# Patient Record
Sex: MALE | Race: WHITE | Employment: OTHER | URBAN - METROPOLITAN AREA
[De-identification: names, ages, dates, MRNs, and addresses within clinical notes are randomized per-mention and may not be internally consistent; named-entity substitution may affect disease eponyms.]

---

## 2017-05-13 ENCOUNTER — HOSPITAL ENCOUNTER (OUTPATIENT)
Dept: OTHER | Age: 67
Discharge: HOME | End: 2017-05-13
Attending: UROLOGY | Admitting: UROLOGY

## 2017-05-13 LAB — PROSTATE SPECIFIC ANTIGEN: 4.87 NG/ML (ref 0–4)

## 2017-08-05 ENCOUNTER — HOSPITAL ENCOUNTER (OUTPATIENT)
Dept: OTHER | Age: 67
Discharge: HOME | End: 2017-08-05
Attending: INTERNAL MEDICINE | Admitting: INTERNAL MEDICINE

## 2017-08-05 LAB
ALBUMIN SERPL-MCNC: 4.3 G/DL (ref 3.4–5)
ALP BLD-CCNC: 54 U/L (ref 40–129)
ALT SERPL-CCNC: 24 U/L (ref 10–40)
ANION GAP SERPL CALCULATED.3IONS-SCNC: 14 MMOL/L (ref 3–16)
AST SERPL-CCNC: 24 U/L (ref 15–37)
BASOPHILS ABSOLUTE: 0.1 K/UL (ref 0–0.2)
BASOPHILS RELATIVE PERCENT: 0.9 %
BILIRUB SERPL-MCNC: 0.6 MG/DL (ref 0–1)
BILIRUBIN DIRECT: <0.2 MG/DL (ref 0–0.3)
BILIRUBIN, INDIRECT: NORMAL MG/DL (ref 0–1)
BUN BLDV-MCNC: 16 MG/DL (ref 7–20)
CALCIUM SERPL-MCNC: 9.5 MG/DL (ref 8.3–10.6)
CHLORIDE BLD-SCNC: 97 MMOL/L (ref 99–110)
CHOLESTEROL, TOTAL: 134 MG/DL (ref 0–199)
CO2: 29 MMOL/L (ref 21–32)
CREAT SERPL-MCNC: 0.8 MG/DL (ref 0.8–1.3)
EOSINOPHILS ABSOLUTE: 0.4 K/UL (ref 0–0.6)
EOSINOPHILS RELATIVE PERCENT: 4.8 %
GFR AFRICAN AMERICAN: >60
GFR NON-AFRICAN AMERICAN: >60
GLUCOSE BLD-MCNC: 135 MG/DL (ref 70–99)
HCT VFR BLD CALC: 47.1 % (ref 40.5–52.5)
HDLC SERPL-MCNC: 34 MG/DL (ref 40–60)
HEMOGLOBIN: 15.8 G/DL (ref 13.5–17.5)
LDL CHOLESTEROL CALCULATED: 60 MG/DL
LYMPHOCYTES ABSOLUTE: 2.3 K/UL (ref 1–5.1)
LYMPHOCYTES RELATIVE PERCENT: 27.8 %
MCH RBC QN AUTO: 28.9 PG (ref 26–34)
MCHC RBC AUTO-ENTMCNC: 33.7 G/DL (ref 31–36)
MCV RBC AUTO: 85.9 FL (ref 80–100)
MONOCYTES ABSOLUTE: 0.5 K/UL (ref 0–1.3)
MONOCYTES RELATIVE PERCENT: 5.8 %
NEUTROPHILS ABSOLUTE: 5 K/UL (ref 1.7–7.7)
NEUTROPHILS RELATIVE PERCENT: 60.7 %
PDW BLD-RTO: 14.7 % (ref 12.4–15.4)
PLATELET # BLD: 276 K/UL (ref 135–450)
PMV BLD AUTO: 8.3 FL (ref 5–10.5)
POTASSIUM SERPL-SCNC: 4.3 MMOL/L (ref 3.5–5.1)
RBC # BLD: 5.48 M/UL (ref 4.2–5.9)
SODIUM BLD-SCNC: 140 MMOL/L (ref 136–145)
TOTAL PROTEIN: 7.1 G/DL (ref 6.4–8.2)
TRIGL SERPL-MCNC: 198 MG/DL (ref 0–150)
VLDLC SERPL CALC-MCNC: 40 MG/DL
WBC # BLD: 8.2 K/UL (ref 4–11)

## 2017-08-06 LAB
ESTIMATED AVERAGE GLUCOSE: 171.4 MG/DL
HBA1C MFR BLD: 7.6 %

## 2018-08-21 ENCOUNTER — HOSPITAL ENCOUNTER (OUTPATIENT)
Dept: ULTRASOUND IMAGING | Age: 68
Discharge: HOME | End: 2018-08-21
Attending: INTERNAL MEDICINE | Admitting: INTERNAL MEDICINE

## 2018-08-21 DIAGNOSIS — Z13.6 ENCOUNTER FOR SCREENING FOR CARDIOVASCULAR DISORDERS: ICD-10-CM

## 2018-08-21 DIAGNOSIS — Z13.6 SCREENING FOR AAA (AORTIC ABDOMINAL ANEURYSM): ICD-10-CM

## 2021-01-11 RX ORDER — PIOGLITAZONEHYDROCHLORIDE 45 MG/1
45 TABLET ORAL DAILY
COMMUNITY

## 2021-01-11 RX ORDER — AMLODIPINE BESYLATE AND BENAZEPRIL HYDROCHLORIDE 5; 10 MG/1; MG/1
1 CAPSULE ORAL DAILY
COMMUNITY

## 2021-01-11 RX ORDER — PAROXETINE HYDROCHLORIDE 20 MG/1
20 TABLET, FILM COATED ORAL NIGHTLY
COMMUNITY

## 2021-01-11 RX ORDER — HYDROCHLOROTHIAZIDE 12.5 MG/1
12.5 CAPSULE, GELATIN COATED ORAL DAILY
COMMUNITY

## 2021-01-11 RX ORDER — GLIMEPIRIDE 2 MG/1
2 TABLET ORAL 2 TIMES DAILY
COMMUNITY
End: 2022-11-02

## 2021-01-11 SDOH — HEALTH STABILITY: MENTAL HEALTH: HOW OFTEN DO YOU HAVE A DRINK CONTAINING ALCOHOL?: NEVER

## 2021-01-22 ENCOUNTER — OFFICE VISIT (OUTPATIENT)
Dept: PRIMARY CARE CLINIC | Age: 71
End: 2021-01-22
Payer: MEDICARE

## 2021-01-22 ENCOUNTER — HOSPITAL ENCOUNTER (OUTPATIENT)
Dept: GENERAL RADIOLOGY | Age: 71
Discharge: HOME | End: 2021-01-22
Payer: MEDICARE

## 2021-01-22 ENCOUNTER — HOSPITAL ENCOUNTER (OUTPATIENT)
Age: 71
Discharge: HOME | End: 2021-01-22
Payer: MEDICARE

## 2021-01-22 DIAGNOSIS — Z20.828 EXPOSURE TO SARS-ASSOCIATED CORONAVIRUS: Primary | ICD-10-CM

## 2021-01-22 DIAGNOSIS — Z01.818 PRE-OP TESTING: ICD-10-CM

## 2021-01-22 LAB
A/G RATIO: 1.6 (ref 1.1–2.2)
ABO/RH: NORMAL
ALBUMIN SERPL-MCNC: 4.5 G/DL (ref 3.4–5)
ALP BLD-CCNC: 55 U/L (ref 40–129)
ALT SERPL-CCNC: 15 U/L (ref 10–40)
ANION GAP SERPL CALCULATED.3IONS-SCNC: 12 MMOL/L (ref 3–16)
ANTIBODY SCREEN: NORMAL
APTT: 32 SEC (ref 24.2–36.2)
AST SERPL-CCNC: 19 U/L (ref 15–37)
BASOPHILS ABSOLUTE: 0 K/UL (ref 0–0.2)
BASOPHILS RELATIVE PERCENT: 0.3 %
BILIRUB SERPL-MCNC: 1 MG/DL (ref 0–1)
BUN BLDV-MCNC: 21 MG/DL (ref 7–20)
CALCIUM SERPL-MCNC: 10.1 MG/DL (ref 8.3–10.6)
CHLORIDE BLD-SCNC: 98 MMOL/L (ref 99–110)
CO2: 29 MMOL/L (ref 21–32)
CREAT SERPL-MCNC: 0.9 MG/DL (ref 0.8–1.3)
EKG ATRIAL RATE: 100 BPM
EKG DIAGNOSIS: NORMAL
EKG P AXIS: 53 DEGREES
EKG P-R INTERVAL: 186 MS
EKG Q-T INTERVAL: 396 MS
EKG QRS DURATION: 146 MS
EKG QTC CALCULATION (BAZETT): 510 MS
EKG R AXIS: 13 DEGREES
EKG T AXIS: 38 DEGREES
EKG VENTRICULAR RATE: 100 BPM
EOSINOPHILS ABSOLUTE: 0.3 K/UL (ref 0–0.6)
EOSINOPHILS RELATIVE PERCENT: 3 %
GFR AFRICAN AMERICAN: >60
GFR NON-AFRICAN AMERICAN: >60
GLOBULIN: 2.9 G/DL
GLUCOSE BLD-MCNC: 177 MG/DL (ref 70–99)
HCT VFR BLD CALC: 48 % (ref 40.5–52.5)
HEMOGLOBIN: 15.9 G/DL (ref 13.5–17.5)
INR BLD: 1.09 (ref 0.86–1.14)
LYMPHOCYTES ABSOLUTE: 1.8 K/UL (ref 1–5.1)
LYMPHOCYTES RELATIVE PERCENT: 19.2 %
MCH RBC QN AUTO: 29.2 PG (ref 26–34)
MCHC RBC AUTO-ENTMCNC: 33.2 G/DL (ref 31–36)
MCV RBC AUTO: 88 FL (ref 80–100)
MONOCYTES ABSOLUTE: 0.7 K/UL (ref 0–1.3)
MONOCYTES RELATIVE PERCENT: 8 %
NEUTROPHILS ABSOLUTE: 6.4 K/UL (ref 1.7–7.7)
NEUTROPHILS RELATIVE PERCENT: 69.5 %
PDW BLD-RTO: 14.9 % (ref 12.4–15.4)
PLATELET # BLD: 355 K/UL (ref 135–450)
PMV BLD AUTO: 8.6 FL (ref 5–10.5)
POTASSIUM SERPL-SCNC: 4.8 MMOL/L (ref 3.5–5.1)
PROTHROMBIN TIME: 12.7 SEC (ref 10–13.2)
RBC # BLD: 5.45 M/UL (ref 4.2–5.9)
SARS-COV-2: NOT DETECTED
SODIUM BLD-SCNC: 139 MMOL/L (ref 136–145)
TOTAL PROTEIN: 7.4 G/DL (ref 6.4–8.2)
WBC # BLD: 9.2 K/UL (ref 4–11)

## 2021-01-22 PROCEDURE — 85610 PROTHROMBIN TIME: CPT

## 2021-01-22 PROCEDURE — 86900 BLOOD TYPING SEROLOGIC ABO: CPT

## 2021-01-22 PROCEDURE — 93010 ELECTROCARDIOGRAM REPORT: CPT | Performed by: INTERNAL MEDICINE

## 2021-01-22 PROCEDURE — 71046 X-RAY EXAM CHEST 2 VIEWS: CPT

## 2021-01-22 PROCEDURE — 99211 OFF/OP EST MAY X REQ PHY/QHP: CPT | Performed by: NURSE PRACTITIONER

## 2021-01-22 PROCEDURE — 86901 BLOOD TYPING SEROLOGIC RH(D): CPT

## 2021-01-22 PROCEDURE — 36415 COLL VENOUS BLD VENIPUNCTURE: CPT

## 2021-01-22 PROCEDURE — 86850 RBC ANTIBODY SCREEN: CPT

## 2021-01-22 PROCEDURE — 93005 ELECTROCARDIOGRAM TRACING: CPT | Performed by: UROLOGY

## 2021-01-22 PROCEDURE — 85730 THROMBOPLASTIN TIME PARTIAL: CPT

## 2021-01-22 PROCEDURE — 85025 COMPLETE CBC W/AUTO DIFF WBC: CPT

## 2021-01-22 PROCEDURE — 80053 COMPREHEN METABOLIC PANEL: CPT

## 2021-01-22 NOTE — PROGRESS NOTES
Royal Noriega received a viral test for COVID-19. They were educated on isolation and quarantine as appropriate. For any symptoms, they were directed to seek care from their PCP, given contact information to establish with a doctor, directed to an urgent care or the emergency room.

## 2021-01-22 NOTE — PATIENT INSTRUCTIONS
You have received a viral test for COVID-19. Below is education on quarantine per the CDC guidelines. For any symptoms, seek care from your PCP, call [phone] to establish care with a doctor, or go directly to an urgent care or the emergency room. Test results will take 2-7 days and will be sent to you in your [employer] account. If you test positive, you will be contacted via phone. If you test negative, the ONLY communication will be through [address] GO TO [employer] AND SIGN UP FOR [employer]  (LOWER LEFT OF THE HOME PAGE)  No test is 100%. If you have symptoms, you should follow the guidance of quarantine as previously stated. You can still be contagious if you have symptoms. Your [facility] will reach out to you if you have a positive result. They will provide you with a return to work date and note. If you were tested for a pre-op, then you should remain in quarantine until your procedure. How do I know if I need to be in quarantine? If you live in a community where COVID-19 is or might be spreading (currently, that is virtually everywhere in the [place])  Be alert for symptoms. Watch for fever, cough, shortness of breath, or other symptoms of COVID-19.  ? Take your temperature if symptoms develop. ? Practice social distancing. Maintain 6 feet of distance from others and stay out of crowded places. ? Follow CDC guidance if symptoms develop. If you feel healthy but:  ? Recently had close contact with a person with COVID-19 you need to Quarantine:  ? Stay home until 14 days after your last exposure. ? Check your temperature twice a day and watch for symptoms of COVID-19.  ? If possible, stay away from people who are at higher-risk for getting very sick from COVID-19. Stay Home and Monitor Your Health if you:  ? Have been diagnosed with COVID-19, or  ? Are waiting for test results, or  ?  Have cough, fever, or shortness of breath, or symptoms of COVID-19 When You Can be Around Others After You Had or Likely Had COVID-19     If you have or think you might have COVID-19, it is important to stay home and away from other people. Staying away from others helps stop the spread of COVID-19. If you have an emergency warning sign (including trouble breathing), get emergency medical care immediately. When you can be around others (end home isolation) depends on different factors for different situations. Find CDC's recommendations for your situation below. I think or know I had COVID-19, and I had symptoms  You can be with others after  ? 3 days with no fever and  ? Respiratory symptoms have improved (e.g. cough, shortness of breath) and  ? 10 days since symptoms first appeared  Depending on your healthcare provider's advice and availability of testing, you might get tested to see if you still have COVID-19. If you will be tested, you can be around others when you have no fever, respiratory symptoms have improved, and you receive two negative test results in a row, at least 24 hours apart. I tested positive for COVID-19 but had no symptoms  If you continue to have no symptoms, you can be with others after:  ? 10 days have passed since test or 14 days since your exposure test   Depending on your healthcare provider's advice and availability of testing, you might get tested to see if you still have COVID-19. If you will be tested, you can be around others after you receive two negative test results in a row, at least 24 hours apart. If you develop symptoms after testing positive, follow the guidance above for I think or know I had COVID, and I had symptoms.   For Anyone Who Has Been Around a Person with COVID-19  It is important to remember that anyone who has close contact with someone with COVID-19 should stay home for 14 days after exposure based on the time it takes to develop illness. Testing is not necessary.     www.cdc.gov/coronavirus/2019-ncov/index.html

## 2021-01-28 ENCOUNTER — ANESTHESIA EVENT (OUTPATIENT)
Dept: OPERATING ROOM | Age: 71
End: 2021-01-28
Payer: MEDICARE

## 2021-01-28 ENCOUNTER — ANESTHESIA (OUTPATIENT)
Dept: OPERATING ROOM | Age: 71
End: 2021-01-28
Payer: MEDICARE

## 2021-01-28 ENCOUNTER — HOSPITAL ENCOUNTER (OUTPATIENT)
Age: 71
Setting detail: OUTPATIENT SURGERY
Discharge: HOME | End: 2021-01-28
Attending: UROLOGY | Admitting: UROLOGY
Payer: MEDICARE

## 2021-01-28 VITALS
OXYGEN SATURATION: 100 % | SYSTOLIC BLOOD PRESSURE: 101 MMHG | TEMPERATURE: 97.9 F | RESPIRATION RATE: 14 BRPM | DIASTOLIC BLOOD PRESSURE: 60 MMHG

## 2021-01-28 VITALS
OXYGEN SATURATION: 97 % | HEIGHT: 70 IN | SYSTOLIC BLOOD PRESSURE: 146 MMHG | DIASTOLIC BLOOD PRESSURE: 63 MMHG | HEART RATE: 88 BPM | RESPIRATION RATE: 15 BRPM | TEMPERATURE: 97.6 F | WEIGHT: 195.44 LBS | BODY MASS INDEX: 27.98 KG/M2

## 2021-01-28 DIAGNOSIS — Z01.818 PRE-OP TESTING: Primary | ICD-10-CM

## 2021-01-28 DIAGNOSIS — C61 PROSTATE CANCER (HCC): ICD-10-CM

## 2021-01-28 LAB
ABO/RH: NORMAL
ANTIBODY SCREEN: NORMAL
GLUCOSE BLD-MCNC: 166 MG/DL (ref 70–99)
GLUCOSE BLD-MCNC: 197 MG/DL (ref 70–99)
PERFORMED ON: ABNORMAL
PERFORMED ON: ABNORMAL

## 2021-01-28 PROCEDURE — 2500000003 HC RX 250 WO HCPCS: Performed by: NURSE ANESTHETIST, CERTIFIED REGISTERED

## 2021-01-28 PROCEDURE — 2580000003 HC RX 258: Performed by: ANESTHESIOLOGY

## 2021-01-28 PROCEDURE — 3700000001 HC ADD 15 MINUTES (ANESTHESIA): Performed by: UROLOGY

## 2021-01-28 PROCEDURE — 86850 RBC ANTIBODY SCREEN: CPT

## 2021-01-28 PROCEDURE — 2580000003 HC RX 258: Performed by: UROLOGY

## 2021-01-28 PROCEDURE — 88309 TISSUE EXAM BY PATHOLOGIST: CPT

## 2021-01-28 PROCEDURE — 2500000003 HC RX 250 WO HCPCS: Performed by: UROLOGY

## 2021-01-28 PROCEDURE — 36415 COLL VENOUS BLD VENIPUNCTURE: CPT

## 2021-01-28 PROCEDURE — 2580000003 HC RX 258: Performed by: NURSE ANESTHETIST, CERTIFIED REGISTERED

## 2021-01-28 PROCEDURE — 6360000002 HC RX W HCPCS: Performed by: ANESTHESIOLOGY

## 2021-01-28 PROCEDURE — 7100000010 HC PHASE II RECOVERY - FIRST 15 MIN: Performed by: UROLOGY

## 2021-01-28 PROCEDURE — 7100000000 HC PACU RECOVERY - FIRST 15 MIN: Performed by: UROLOGY

## 2021-01-28 PROCEDURE — 86900 BLOOD TYPING SEROLOGIC ABO: CPT

## 2021-01-28 PROCEDURE — 3600000019 HC SURGERY ROBOT ADDTL 15MIN: Performed by: UROLOGY

## 2021-01-28 PROCEDURE — 3600000009 HC SURGERY ROBOT BASE: Performed by: UROLOGY

## 2021-01-28 PROCEDURE — S2900 ROBOTIC SURGICAL SYSTEM: HCPCS | Performed by: UROLOGY

## 2021-01-28 PROCEDURE — 3700000000 HC ANESTHESIA ATTENDED CARE: Performed by: UROLOGY

## 2021-01-28 PROCEDURE — 2720000010 HC SURG SUPPLY STERILE: Performed by: UROLOGY

## 2021-01-28 PROCEDURE — 7100000001 HC PACU RECOVERY - ADDTL 15 MIN: Performed by: UROLOGY

## 2021-01-28 PROCEDURE — 88305 TISSUE EXAM BY PATHOLOGIST: CPT

## 2021-01-28 PROCEDURE — 6370000000 HC RX 637 (ALT 250 FOR IP): Performed by: ANESTHESIOLOGY

## 2021-01-28 PROCEDURE — C9290 INJ, BUPIVACAINE LIPOSOME: HCPCS | Performed by: UROLOGY

## 2021-01-28 PROCEDURE — 2709999900 HC NON-CHARGEABLE SUPPLY: Performed by: UROLOGY

## 2021-01-28 PROCEDURE — 86901 BLOOD TYPING SEROLOGIC RH(D): CPT

## 2021-01-28 PROCEDURE — 6360000002 HC RX W HCPCS: Performed by: UROLOGY

## 2021-01-28 PROCEDURE — 6360000002 HC RX W HCPCS: Performed by: NURSE ANESTHETIST, CERTIFIED REGISTERED

## 2021-01-28 PROCEDURE — 7100000011 HC PHASE II RECOVERY - ADDTL 15 MIN: Performed by: UROLOGY

## 2021-01-28 PROCEDURE — 6360000002 HC RX W HCPCS

## 2021-01-28 PROCEDURE — 6370000000 HC RX 637 (ALT 250 FOR IP): Performed by: UROLOGY

## 2021-01-28 RX ORDER — DEXAMETHASONE SODIUM PHOSPHATE 4 MG/ML
INJECTION, SOLUTION INTRA-ARTICULAR; INTRALESIONAL; INTRAMUSCULAR; INTRAVENOUS; SOFT TISSUE PRN
Status: DISCONTINUED | OUTPATIENT
Start: 2021-01-28 | End: 2021-01-28 | Stop reason: SDUPTHER

## 2021-01-28 RX ORDER — ATROPA BELLADONNA AND OPIUM 16.2; 6 MG/1; MG/1
SUPPOSITORY RECTAL
Status: COMPLETED | OUTPATIENT
Start: 2021-01-28 | End: 2021-01-28

## 2021-01-28 RX ORDER — ONDANSETRON 4 MG/1
4 TABLET, FILM COATED ORAL 3 TIMES DAILY PRN
Qty: 10 TABLET | Refills: 0 | Status: SHIPPED | OUTPATIENT
Start: 2021-01-28 | End: 2022-11-02

## 2021-01-28 RX ORDER — NALOXONE HYDROCHLORIDE 1 MG/ML
0.04 INJECTION INTRAMUSCULAR; INTRAVENOUS; SUBCUTANEOUS ONCE
Status: COMPLETED | OUTPATIENT
Start: 2021-01-28 | End: 2021-01-28

## 2021-01-28 RX ORDER — LIDOCAINE HYDROCHLORIDE 10 MG/ML
1 INJECTION, SOLUTION EPIDURAL; INFILTRATION; INTRACAUDAL; PERINEURAL
Status: DISCONTINUED | OUTPATIENT
Start: 2021-01-28 | End: 2021-01-28 | Stop reason: HOSPADM

## 2021-01-28 RX ORDER — ONDANSETRON 2 MG/ML
INJECTION INTRAMUSCULAR; INTRAVENOUS PRN
Status: DISCONTINUED | OUTPATIENT
Start: 2021-01-28 | End: 2021-01-28 | Stop reason: SDUPTHER

## 2021-01-28 RX ORDER — KETAMINE HCL IN NACL, ISO-OSM 100MG/10ML
SYRINGE (ML) INJECTION PRN
Status: DISCONTINUED | OUTPATIENT
Start: 2021-01-28 | End: 2021-01-28 | Stop reason: SDUPTHER

## 2021-01-28 RX ORDER — MAGNESIUM SULFATE HEPTAHYDRATE 500 MG/ML
INJECTION, SOLUTION INTRAMUSCULAR; INTRAVENOUS PRN
Status: DISCONTINUED | OUTPATIENT
Start: 2021-01-28 | End: 2021-01-28 | Stop reason: SDUPTHER

## 2021-01-28 RX ORDER — APREPITANT 40 MG/1
40 CAPSULE ORAL ONCE
Status: COMPLETED | OUTPATIENT
Start: 2021-01-28 | End: 2021-01-28

## 2021-01-28 RX ORDER — LIDOCAINE HYDROCHLORIDE 20 MG/ML
INJECTION, SOLUTION EPIDURAL; INFILTRATION; INTRACAUDAL; PERINEURAL PRN
Status: DISCONTINUED | OUTPATIENT
Start: 2021-01-28 | End: 2021-01-28 | Stop reason: SDUPTHER

## 2021-01-28 RX ORDER — HYDROMORPHONE HCL 110MG/55ML
0.25 PATIENT CONTROLLED ANALGESIA SYRINGE INTRAVENOUS EVERY 5 MIN PRN
Status: DISCONTINUED | OUTPATIENT
Start: 2021-01-28 | End: 2021-01-28 | Stop reason: HOSPADM

## 2021-01-28 RX ORDER — MAGNESIUM HYDROXIDE 1200 MG/15ML
LIQUID ORAL
Status: COMPLETED | OUTPATIENT
Start: 2021-01-28 | End: 2021-01-28

## 2021-01-28 RX ORDER — SODIUM CHLORIDE 9 MG/ML
INJECTION, SOLUTION INTRAVENOUS CONTINUOUS PRN
Status: DISCONTINUED | OUTPATIENT
Start: 2021-01-28 | End: 2021-01-28 | Stop reason: SDUPTHER

## 2021-01-28 RX ORDER — LIDOCAINE HYDROCHLORIDE 10 MG/ML
0.5 INJECTION, SOLUTION EPIDURAL; INFILTRATION; INTRACAUDAL; PERINEURAL ONCE
Status: DISCONTINUED | OUTPATIENT
Start: 2021-01-28 | End: 2021-01-28 | Stop reason: HOSPADM

## 2021-01-28 RX ORDER — SODIUM CHLORIDE 9 MG/ML
INJECTION, SOLUTION INTRAVENOUS CONTINUOUS
Status: DISCONTINUED | OUTPATIENT
Start: 2021-01-28 | End: 2021-01-28 | Stop reason: HOSPADM

## 2021-01-28 RX ORDER — VECURONIUM BROMIDE 1 MG/ML
INJECTION, POWDER, LYOPHILIZED, FOR SOLUTION INTRAVENOUS PRN
Status: DISCONTINUED | OUTPATIENT
Start: 2021-01-28 | End: 2021-01-28 | Stop reason: SDUPTHER

## 2021-01-28 RX ORDER — ACETAMINOPHEN 325 MG/1
650 TABLET ORAL ONCE
Status: COMPLETED | OUTPATIENT
Start: 2021-01-28 | End: 2021-01-28

## 2021-01-28 RX ORDER — PROPOFOL 10 MG/ML
INJECTION, EMULSION INTRAVENOUS PRN
Status: DISCONTINUED | OUTPATIENT
Start: 2021-01-28 | End: 2021-01-28 | Stop reason: SDUPTHER

## 2021-01-28 RX ORDER — APREPITANT 40 MG/1
CAPSULE ORAL
Status: COMPLETED
Start: 2021-01-28 | End: 2021-01-28

## 2021-01-28 RX ORDER — BUPIVACAINE HYDROCHLORIDE 5 MG/ML
INJECTION, SOLUTION EPIDURAL; INTRACAUDAL
Status: COMPLETED | OUTPATIENT
Start: 2021-01-28 | End: 2021-01-28

## 2021-01-28 RX ORDER — HYDROMORPHONE HCL 110MG/55ML
PATIENT CONTROLLED ANALGESIA SYRINGE INTRAVENOUS PRN
Status: DISCONTINUED | OUTPATIENT
Start: 2021-01-28 | End: 2021-01-28 | Stop reason: SDUPTHER

## 2021-01-28 RX ORDER — HYDROMORPHONE HCL 110MG/55ML
0.5 PATIENT CONTROLLED ANALGESIA SYRINGE INTRAVENOUS EVERY 5 MIN PRN
Status: DISCONTINUED | OUTPATIENT
Start: 2021-01-28 | End: 2021-01-28 | Stop reason: HOSPADM

## 2021-01-28 RX ORDER — FENTANYL CITRATE 50 UG/ML
INJECTION, SOLUTION INTRAMUSCULAR; INTRAVENOUS PRN
Status: DISCONTINUED | OUTPATIENT
Start: 2021-01-28 | End: 2021-01-28 | Stop reason: SDUPTHER

## 2021-01-28 RX ORDER — HYDROCODONE BITARTRATE AND ACETAMINOPHEN 5; 325 MG/1; MG/1
1 TABLET ORAL
Status: DISCONTINUED | OUTPATIENT
Start: 2021-01-28 | End: 2021-01-28 | Stop reason: HOSPADM

## 2021-01-28 RX ORDER — SUCCINYLCHOLINE/SOD CL,ISO/PF 200MG/10ML
SYRINGE (ML) INTRAVENOUS PRN
Status: DISCONTINUED | OUTPATIENT
Start: 2021-01-28 | End: 2021-01-28 | Stop reason: SDUPTHER

## 2021-01-28 RX ORDER — EPHEDRINE SULFATE/0.9% NACL/PF 50 MG/5 ML
SYRINGE (ML) INTRAVENOUS PRN
Status: DISCONTINUED | OUTPATIENT
Start: 2021-01-28 | End: 2021-01-28 | Stop reason: SDUPTHER

## 2021-01-28 RX ORDER — HYDROCODONE BITARTRATE AND ACETAMINOPHEN 5; 325 MG/1; MG/1
1 TABLET ORAL EVERY 6 HOURS PRN
Qty: 12 TABLET | Refills: 0 | Status: SHIPPED | OUTPATIENT
Start: 2021-01-28 | End: 2021-01-31

## 2021-01-28 RX ORDER — AMOXICILLIN 250 MG
1 CAPSULE ORAL 2 TIMES DAILY
Qty: 50 TABLET | Refills: 0 | Status: SHIPPED | OUTPATIENT
Start: 2021-01-28 | End: 2022-11-02

## 2021-01-28 RX ORDER — MAGNESIUM HYDROXIDE 1200 MG/15ML
LIQUID ORAL CONTINUOUS PRN
Status: COMPLETED | OUTPATIENT
Start: 2021-01-28 | End: 2021-01-28

## 2021-01-28 RX ORDER — LIDOCAINE HYDROCHLORIDE 20 MG/ML
JELLY TOPICAL
Status: COMPLETED | OUTPATIENT
Start: 2021-01-28 | End: 2021-01-28

## 2021-01-28 RX ORDER — ONDANSETRON 2 MG/ML
4 INJECTION INTRAMUSCULAR; INTRAVENOUS
Status: COMPLETED | OUTPATIENT
Start: 2021-01-28 | End: 2021-01-28

## 2021-01-28 RX ADMIN — Medication 100 MG: at 12:43

## 2021-01-28 RX ADMIN — VECURONIUM BROMIDE 2 MG: 1 INJECTION, POWDER, LYOPHILIZED, FOR SOLUTION INTRAVENOUS at 15:01

## 2021-01-28 RX ADMIN — HYDROMORPHONE HYDROCHLORIDE 0.2 MG: 2 INJECTION, SOLUTION INTRAMUSCULAR; INTRAVENOUS; SUBCUTANEOUS at 16:01

## 2021-01-28 RX ADMIN — Medication 20 MG: at 13:08

## 2021-01-28 RX ADMIN — MAGNESIUM SULFATE HEPTAHYDRATE 1 G: 500 INJECTION, SOLUTION INTRAMUSCULAR; INTRAVENOUS at 13:08

## 2021-01-28 RX ADMIN — HYDROMORPHONE HYDROCHLORIDE 0.4 MG: 2 INJECTION, SOLUTION INTRAMUSCULAR; INTRAVENOUS; SUBCUTANEOUS at 16:09

## 2021-01-28 RX ADMIN — NALOXONE HYDROCHLORIDE 0.04 MG: 1 INJECTION PARENTERAL at 17:03

## 2021-01-28 RX ADMIN — SODIUM CHLORIDE: 9 INJECTION, SOLUTION INTRAVENOUS at 12:38

## 2021-01-28 RX ADMIN — ONDANSETRON 4 MG: 2 INJECTION INTRAMUSCULAR; INTRAVENOUS at 15:42

## 2021-01-28 RX ADMIN — SODIUM CHLORIDE: 9 INJECTION, SOLUTION INTRAVENOUS at 11:46

## 2021-01-28 RX ADMIN — SODIUM CHLORIDE: 9 INJECTION, SOLUTION INTRAVENOUS at 11:53

## 2021-01-28 RX ADMIN — HYDROMORPHONE HYDROCHLORIDE 0.4 MG: 2 INJECTION, SOLUTION INTRAMUSCULAR; INTRAVENOUS; SUBCUTANEOUS at 14:29

## 2021-01-28 RX ADMIN — HYDROMORPHONE HYDROCHLORIDE 0.2 MG: 2 INJECTION, SOLUTION INTRAMUSCULAR; INTRAVENOUS; SUBCUTANEOUS at 16:05

## 2021-01-28 RX ADMIN — HYDROMORPHONE HYDROCHLORIDE 0.2 MG: 2 INJECTION, SOLUTION INTRAMUSCULAR; INTRAVENOUS; SUBCUTANEOUS at 16:17

## 2021-01-28 RX ADMIN — FENTANYL CITRATE 100 MCG: 50 INJECTION INTRAMUSCULAR; INTRAVENOUS at 12:43

## 2021-01-28 RX ADMIN — NALOXONE HYDROCHLORIDE 0.04 MG: 1 INJECTION PARENTERAL at 17:11

## 2021-01-28 RX ADMIN — HYDROMORPHONE HYDROCHLORIDE 0.2 MG: 2 INJECTION, SOLUTION INTRAMUSCULAR; INTRAVENOUS; SUBCUTANEOUS at 15:57

## 2021-01-28 RX ADMIN — CEFAZOLIN SODIUM 2000 MG: 10 INJECTION, POWDER, FOR SOLUTION INTRAVENOUS at 12:36

## 2021-01-28 RX ADMIN — PROPOFOL 200 MG: 10 INJECTION, EMULSION INTRAVENOUS at 12:43

## 2021-01-28 RX ADMIN — VECURONIUM BROMIDE 6 MG: 1 INJECTION, POWDER, LYOPHILIZED, FOR SOLUTION INTRAVENOUS at 12:50

## 2021-01-28 RX ADMIN — VECURONIUM BROMIDE 2 MG: 1 INJECTION, POWDER, LYOPHILIZED, FOR SOLUTION INTRAVENOUS at 14:27

## 2021-01-28 RX ADMIN — ONDANSETRON 4 MG: 2 INJECTION INTRAMUSCULAR; INTRAVENOUS at 18:22

## 2021-01-28 RX ADMIN — Medication 10 MG: at 13:08

## 2021-01-28 RX ADMIN — LIDOCAINE HYDROCHLORIDE 100 MG: 20 INJECTION, SOLUTION EPIDURAL; INFILTRATION; INTRACAUDAL; PERINEURAL at 12:43

## 2021-01-28 RX ADMIN — DEXAMETHASONE SODIUM PHOSPHATE 4 MG: 4 INJECTION, SOLUTION INTRAMUSCULAR; INTRAVENOUS at 13:08

## 2021-01-28 RX ADMIN — HYDROMORPHONE HYDROCHLORIDE 0.2 MG: 2 INJECTION, SOLUTION INTRAMUSCULAR; INTRAVENOUS; SUBCUTANEOUS at 16:13

## 2021-01-28 RX ADMIN — SUGAMMADEX 200 MG: 100 INJECTION, SOLUTION INTRAVENOUS at 15:58

## 2021-01-28 RX ADMIN — ACETAMINOPHEN 650 MG: 325 TABLET ORAL at 11:58

## 2021-01-28 RX ADMIN — HYDROMORPHONE HYDROCHLORIDE 0.2 MG: 2 INJECTION, SOLUTION INTRAMUSCULAR; INTRAVENOUS; SUBCUTANEOUS at 16:11

## 2021-01-28 RX ADMIN — VECURONIUM BROMIDE 1 MG: 1 INJECTION, POWDER, LYOPHILIZED, FOR SOLUTION INTRAVENOUS at 13:10

## 2021-01-28 RX ADMIN — APREPITANT 40 MG: 40 CAPSULE ORAL at 11:58

## 2021-01-28 RX ADMIN — VECURONIUM BROMIDE 2 MG: 1 INJECTION, POWDER, LYOPHILIZED, FOR SOLUTION INTRAVENOUS at 13:43

## 2021-01-28 RX ADMIN — SODIUM CHLORIDE: 9 INJECTION, SOLUTION INTRAVENOUS at 15:50

## 2021-01-28 RX ADMIN — VECURONIUM BROMIDE 3 MG: 1 INJECTION, POWDER, LYOPHILIZED, FOR SOLUTION INTRAVENOUS at 13:58

## 2021-01-28 RX ADMIN — Medication 10 MG: at 13:24

## 2021-01-28 RX ADMIN — Medication 10 MG: at 15:42

## 2021-01-28 ASSESSMENT — PULMONARY FUNCTION TESTS
PIF_VALUE: 17
PIF_VALUE: 28
PIF_VALUE: 28
PIF_VALUE: 15
PIF_VALUE: 19
PIF_VALUE: 20
PIF_VALUE: 25
PIF_VALUE: 28
PIF_VALUE: 28
PIF_VALUE: 27
PIF_VALUE: 27
PIF_VALUE: 3
PIF_VALUE: 28
PIF_VALUE: 28
PIF_VALUE: 26
PIF_VALUE: 20
PIF_VALUE: 28
PIF_VALUE: 29
PIF_VALUE: 28
PIF_VALUE: 18
PIF_VALUE: 28
PIF_VALUE: 16
PIF_VALUE: 18
PIF_VALUE: 29
PIF_VALUE: 25
PIF_VALUE: 19
PIF_VALUE: 15
PIF_VALUE: 19
PIF_VALUE: 28
PIF_VALUE: 18
PIF_VALUE: 0
PIF_VALUE: 28
PIF_VALUE: 27
PIF_VALUE: 26
PIF_VALUE: 27
PIF_VALUE: 28
PIF_VALUE: 18
PIF_VALUE: 27
PIF_VALUE: 28
PIF_VALUE: 18
PIF_VALUE: 28
PIF_VALUE: 3
PIF_VALUE: 28
PIF_VALUE: 18
PIF_VALUE: 28
PIF_VALUE: 28
PIF_VALUE: 18
PIF_VALUE: 3
PIF_VALUE: 24
PIF_VALUE: 27
PIF_VALUE: 28
PIF_VALUE: 28
PIF_VALUE: 19
PIF_VALUE: 26
PIF_VALUE: 17
PIF_VALUE: 28
PIF_VALUE: 28
PIF_VALUE: 20
PIF_VALUE: 28
PIF_VALUE: 27
PIF_VALUE: 29
PIF_VALUE: 29
PIF_VALUE: 28
PIF_VALUE: 26
PIF_VALUE: 28
PIF_VALUE: 29
PIF_VALUE: 27
PIF_VALUE: 18
PIF_VALUE: 27
PIF_VALUE: 27
PIF_VALUE: 0
PIF_VALUE: 28
PIF_VALUE: 28
PIF_VALUE: 29
PIF_VALUE: 27
PIF_VALUE: 28
PIF_VALUE: 19
PIF_VALUE: 28
PIF_VALUE: 29
PIF_VALUE: 28
PIF_VALUE: 28
PIF_VALUE: 27
PIF_VALUE: 17
PIF_VALUE: 28
PIF_VALUE: 28
PIF_VALUE: 27
PIF_VALUE: 28
PIF_VALUE: 17
PIF_VALUE: 25
PIF_VALUE: 28
PIF_VALUE: 28
PIF_VALUE: 29
PIF_VALUE: 28
PIF_VALUE: 17
PIF_VALUE: 27
PIF_VALUE: 27

## 2021-01-28 ASSESSMENT — PAIN SCALES - GENERAL: PAINLEVEL_OUTOF10: 0

## 2021-01-28 ASSESSMENT — LIFESTYLE VARIABLES: SMOKING_STATUS: 1

## 2021-01-28 ASSESSMENT — PAIN - FUNCTIONAL ASSESSMENT: PAIN_FUNCTIONAL_ASSESSMENT: 0-10

## 2021-01-28 NOTE — PROGRESS NOTES
VINES CATHETER EXCHANGED FROM 16FR, 10ML BALLOON TO A 18FR, 20ML BALLOON DURING PROCEDURE UNDER STERILE CONDITIONS BY  SHU FUENTES RN

## 2021-01-28 NOTE — PROGRESS NOTES
Patient more alert, arouses easily when asleep, VSS and O2 sat maintained on RA. Denies pain. Abdominal incisions x5 CDI with skin glue. Spencer patent with clear pink urine. Patients wife educated on spencer care and she verbalizes understanding and denies questions. Supplies provided.

## 2021-01-28 NOTE — PROGRESS NOTES
Patient remains difficult to arouse, obstructs upon falling back to sleep. Dr. Manuel Crane notified and new orders received for narcan. Medicated per new orders and continue to monitor.

## 2021-01-28 NOTE — ANESTHESIA PRE PROCEDURE
Department of Anesthesiology  Preprocedure Note       Name:  Kuldeep Cerda   Age:  66 y.o.  :  1950                                          MRN:  [id number]         Date:  2021      Surgeon: Kimi Patel):  Caleb Marquez MD    Procedure: Procedure(s):  ROBOTIC LAPAROSCOPIC RADICAL PROSTATECTOMY WITH BILATERAL LYMPH NODE DISSECTION AND NON NERVE SPARE    Medications prior to admission:   Prior to Admission medications    Medication Sig Start Date End Date Taking?  Authorizing Provider   metFORMIN (GLUCOPHAGE) 1000 MG tablet Take 1,000 mg by mouth 2 times daily (with meals)   Yes Historical Provider, MD   glimepiride (AMARYL) 2 MG tablet Take 2 mg by mouth 2 times daily   Yes Historical Provider, MD   pioglitazone (ACTOS) 45 MG tablet Take 45 mg by mouth daily   Yes Historical Provider, MD   hydroCHLOROthiazide (MICROZIDE) 12.5 MG capsule Take 12.5 mg by mouth daily   Yes Historical Provider, MD   amLODIPine-benazepril (LOTREL) 5-10 MG per capsule Take 1 capsule by mouth daily   Yes Historical Provider, MD   PARoxetine (PAXIL) 20 MG tablet Take 20 mg by mouth nightly   Yes Historical Provider, MD   Rosuvastatin Calcium 10 MG CPSP Take by mouth nightly   Yes Historical Provider, MD       Current medications:    Current Facility-Administered Medications   Medication Dose Route Frequency Provider Last Rate Last Admin    0.9 % sodium chloride infusion   Intravenous Continuous Caleb Marquez MD        lidocaine PF 1 % injection 0.5 mL  0.5 mL Intradermal Once Caleb Marquez MD        ceFAZolin (ANCEF) 2000 mg in dextrose 5 % 100 mL IVPB  2 g Intravenous On Call to [facility] Deny Ramirez MD        HYDROcodone-acetaminophen (NORCO) 5-325 MG per tablet 1 tablet  1 tablet Oral Once PRN Cornelius Winter MD        ondansetron [facility]) injection 4 mg  4 mg Intravenous Once PRN Cornelius Winter MD        0.9 % sodium chloride infusion   Intravenous Continuous Cornelius Winter MD  lidocaine PF 1 % injection 1 mL  1 mL Intradermal Once PRN Aneudy Morales MD        HYDROmorphone (DILAUDID) injection 0.25 mg  0.25 mg Intravenous Q5 Min PRN Aneudy Morales MD        HYDROmorphone (DILAUDID) injection 0.5 mg  0.5 mg Intravenous Q5 Min PRN Aneudy Morales MD        acetaminophen (TYLENOL) tablet 650 mg  650 mg Oral Once Aneudy Morales MD           Allergies:  No Known Allergies    Problem List:  There is no problem list on file for this patient. Past Medical History:        Diagnosis Date    Diabetes mellitus ([facility] [address]     Hyperlipidemia     Hypertension        Past Surgical History:  History reviewed. No pertinent surgical history. Social History:    Social History     Tobacco Use    Smoking status: Current Every Day Smoker     Packs/day: 1.00    Smokeless tobacco: Never Used   Substance Use Topics    Alcohol use: Never     Frequency: Never                                Ready to quit: Not Answered  Counseling given: Not Answered      Vital Signs (Current):   Vitals:    01/11/21 1145 01/28/21 1117   BP:  (!) 150/84   Pulse:  101   Resp:  16   Temp:  99.3 °F (37.4 °C)   TempSrc:  Temporal   SpO2:  98%   Weight: 202 lb (91.6 kg) 195 lb 7 oz (88.6 kg)   Height: 5' 10\" (1.778 m)                                               BP Readings from Last 3 Encounters:   01/28/21 (!) 150/84       NPO Status: Time of last liquid consumption: 2300                        Time of last solid consumption: 2300                        Date of last liquid consumption: 01/27/21                        Date of last solid food consumption: 01/27/21    BMI:   Wt Readings from Last 3 Encounters:   01/28/21 195 lb 7 oz (88.6 kg)     Body mass index is 28.04 kg/m².     CBC:   Lab Results   Component Value Date    WBC 9.2 01/22/2021    RBC 5.45 01/22/2021    HGB 15.9 01/22/2021    HCT 48.0 01/22/2021    MCV 88.0 01/22/2021    RDW 14.9 01/22/2021     01/22/2021       CMP:   Lab Results Component Value Date     01/22/2021    K 4.8 01/22/2021    CL 98 01/22/2021    CO2 29 01/22/2021    BUN 21 01/22/2021    CREATININE 0.9 01/22/2021    GFRAA >60 01/22/2021    GFRAA >60 06/19/2010    AGRATIO 1.6 01/22/2021    LABGLOM >60 01/22/2021    GLUCOSE 177 01/22/2021    PROT 7.4 01/22/2021    PROT 7.5 06/19/2010    CALCIUM 10.1 01/22/2021    BILITOT 1.0 01/22/2021    ALKPHOS 55 01/22/2021    AST 19 01/22/2021    ALT 15 01/22/2021       POC Tests: No results for input(s): POCGLU, POCNA, POCK, POCCL, POCBUN, POCHEMO, POCHCT in the last 72 hours. Coags:   Lab Results   Component Value Date    PROTIME 12.7 01/22/2021    INR 1.09 01/22/2021    APTT 32.0 01/22/2021       HCG (If Applicable): No results found for: PREGTESTUR, PREGSERUM, HCG, HCGQUANT     ABGs: No results found for: PHART, PO2ART, DPD5PSY, NVA3TEW, BEART, M5VIUGTH     Type & Screen (If Applicable):  No results found for: LABABO, LABRH    Drug/Infectious Status (If Applicable):  No results found for: HIV, HEPCAB    COVID-19 Screening (If Applicable):   Lab Results   Component Value Date    COVID19 Not Detected 01/22/2021         Anesthesia Evaluation  Patient summary reviewed no history of anesthetic complications:   Airway: Mallampati: II  TM distance: >3 FB   Neck ROM: full  Mouth opening: > = 3 FB Dental:    (+) upper dentures      Pulmonary: breath sounds clear to auscultation  (+) current smoker                           Cardiovascular:  Exercise tolerance: good (>4 METS),   (+) hypertension:,     (-) dysrhythmias,  angina and  CHF      Rhythm: regular  Rate: normal                    Neuro/Psych:   (+) psychiatric history (used to get panic attacks): stable with treatment   (-) seizures, TIA and CVA           GI/Hepatic/Renal:        (-) GERD       Endo/Other:    (+) Diabetes, . ROS comment: No personal history of GA. Gets nauseous with pain pills. No family history of problems with GA.  Abdominal:           Vascular: Anesthesia Plan      general     ASA 2       Induction: intravenous. MIPS: Postoperative opioids intended, Prophylactic antiemetics administered and Postoperative trial extubation. Anesthetic plan and risks discussed with patient. Use of blood products discussed with patient whom consented to blood products. Plan discussed with CRNA.                   Arpit Breen MD   1/28/2021

## 2021-01-28 NOTE — PROGRESS NOTES
Dr. Janessa Cedillo at bedside. Patient remains drowsy and obstructing upon falling asleep. Orders received for second dose of narcan, administered. Continue to monitor.

## 2021-01-28 NOTE — PROGRESS NOTES
Patient transferred from OR to PACU, VSS and O2 sat maintained on 6L via simple mask, oral airway in place. Patient not responding to stimuli at this time. No sign of pain. Abdominal incisions x5 CDI with skin glue. Mcduffie patent cherry, clear urine. Continue to monitor.

## 2021-01-28 NOTE — OP NOTE
Urology Operative Report  [facility]    Provider: Latonya Garcia MD Patient ID:  Admission Date: 2021 Name: Tate Fraser Date: 2021  MRN: [id number]   Patient Location: OR/NONE : 1950  Attending: Latonya Garcia MD Date of Service: 2021  PCP: Sofia King MD     Date of Operation: 2021     Preoperative Diagnosis: Prostate Cancer    Postoperative Diagnosis: same    Procedure:    1. Robotic assisted laparoscopic radical prostatectomy  2. Right side pelvic lymphadenectomy  3. Left side pelvic lymphadenectomy  4. Layered closure of the anterior abdominal wall    Surgeon:   Latonya Garcia MD    Anesthesia: General endotracheal anesthesia    Indications: Sameera Martínez is a 66 y.o. male who presents for the above named surgery. Informed consent was obtained and the risks, benefits, and details of the procedure were explained to the patient who elected to proceed. Details of Procedure:  After informed consent was obtained, making the patient aware of the risks, benefits and alternatives to the procedure, he was taken back to the surgical suite and positioned in a supine position on the surgical table. SCDs were placed on the lower extremities. General anesthesia was induced, and he was transferred to a dorsal lithotomy position. All pressure points were carefully padded. His genitalia and abdomen were prepped and draped in the usual sterile fashion. A timeout procedure was performed, properly identifying the patient, procedure, and operative site. A spencer catheter was placed and the bladder was drained. A supraumbilical incision was made, and a Veress needle was used to introduce pneumoperitoneum requiring 1 attempt(s). A camera port was then introduced into the abdomen, and the intra-abdominal contents were inspected for any sign of injury. There was none.  The laparoscopic ports were placed in the usual fashion, two 8 mm ports in the left lower quadrant, one 8 mm port in the right lower quadrant, an assistant port in the lateral right lower quadrant, and a 5 mm port in the right upper quadrant. The patient was put in steep Trendelenburg position and the robot was docked. The laparoscopic instruments were introduced into the abdomen under direct vision. The bilateral lymphadenectomy was done first.  Beginning with the right side, the peritoneum was incised over the iliac vessles. The lymph node packet margins were the external iliac vessels, obturator nerve, internal iliac artery, bladder medially, and pelvic floor. The obturator neve was identified and spared. Surgicel was placed into the dissection bed. We then performed lymphadenectomy of the left side. The peritoneum was incised over the iliac vessles. The lymph node packet margins were the external iliac vessels, obturator nerve, internal iliac artery, bladder medially, and pelvic floor. The obturator neve was identified and spared. Surgicel was placed into the dissection bed. The left and right side lymph node packets were then placed in a specimen pouch. A retrovesical approach to the prostate was used. An incision was made in the peritoneum and the bilateral seminal vesicles and vas deferens were identified in the midline. The vas was followed out laterally and cut. The seminal vesicles were freed from all surrounding attachments. Care was taken on the side of attempted nerve sparing to avoid using electrical energy. Denonvier's fascia was incised and the posterior plane of dissection extended distally to the apex of the prostate. The bladder was then dropped from its retropubic location and bladder attachments were taken down until the endopelvic fascia was identified. The endopelvic fascia was incised and lateral prostate attachments taken down allowing completion of the lateral dissection of the prostate. This was performed bilaterally.  The anterior prostate fat was cleaned off the midline incision was closed with 0-ethibond interrupted stitches for the fascial level followed by 3-0 Vicryl deep dermal stitches. All incisions were closed with 4-0 monofilament and dermabond. The new 18-Uzbek Mcduffie catheter had a statLock positioned. At the end of the procedure all needle, lap, instrument and sponge counts were correct. The patient tolerated the procedure well, was extubated without issue in the operating room, and was transported to the PACU in stable condition. Findings: There did appear to be an adequate surgical margin. There was a watertight urethral vesicle anastomosis. Estimated Blood Loss: Approximately 25 mL                  Drains:   18 Uzbek urethral Mcduffie catheter to gravity drainage          Specimens:   1. Prostate, bilateral seminal vesicles, and anterior prostate fat. 2. Right side pelvic lymph nodes. 3. Left side pelvic lymph nodes. Complications: none apparent           Disposition:  PACU - hemodynamically stable.             Caleb Marquez MD  1/28/2021

## 2021-01-28 NOTE — H&P
Urology History and Physical  Inpatient Setting - [facility]    Provider: Valery Reyes MD Patient ID:  Admission Date: 2021 Name: Jacqueline Patel Date: n/a MRN: [id number]   Patient Location: OR/NONE : 1950  Attending: Valery Reyes MD Date of Service: 2021  PCP: Deonna Hernandez MD     Diagnoses:  Prostate Cancer    Assessment/Plan:  Continue to the OR as planned for RARLP w BPLND    All the patients questions were answered in detail. He understands the plan as listed above.                                                                                                                                               _____________________________________________________________    CC: Pre-op    HPI: Audra Graham is a 66 y.o. male. The history and physical exam was reviewed. The patient was seen and examined in pre-op. He had a chance to ask questions which were answered. There has been no interval changes. Plan to continue to the OR    Past Medical History:   He has a past medical history of Diabetes mellitus ([address] Hyperlipidemia, and Hypertension. Past Surgical History:  He has no past surgical history on file. Allergies:   No Known Allergies    Social History:  He reports that he has been smoking. He has been smoking about 1.00 pack per day. He has never used smokeless tobacco. He reports that he does not drink alcohol or use drugs. Family History:  family history is not on file.     Medications:   Scheduled Meds:   lidocaine PF  0.5 mL Intradermal Once    ceFAZolin  2 g Intravenous On Call to OR     Continuous Infusions:   sodium chloride 50 mL/hr at 21 1153    sodium chloride 125 mL/hr at 21 1146     PRN Meds:HYDROcodone 5 mg - acetaminophen, ondansetron, lidocaine PF, HYDROmorphone, HYDROmorphone    Review of Systems:  Constitutional: Negative for fever    Genitourinary: see HPI  Eyes: negative for sudden change in vision  EENT: no complaints  Cardiovascular: Negative for chest pain  Respiratory: Negative for shortness of breath  Gastrointestinal: Negative for nausea  Musculoskeletal: Negative for back pain   Neurological: Negative for weakness  Psychiatric: Negative for anxiety  Integumentary: Negative for rashes or adenopathy     Physical Exam:  Vitals:    01/28/21 1117   BP: (!) 150/84   Pulse: 101   Resp: 16   Temp: 99.3 °F (37.4 °C)   SpO2: 98%     Constitutional: NAD, well-developed, well-nourished. Cardiovascular: Regular rate. No peripheral edema  Respiratory: Respirations are even and non-labored. No audible breath sounds. Psychiatric: A + O x 3, normal affect. Insight appears intact.      Peter Garcia MD  1/28/2021

## 2021-01-28 NOTE — ANESTHESIA POSTPROCEDURE EVALUATION
Department of Anesthesiology  Postprocedure Note    Patient: Lj Sensor  MRN: [id number]  YOB: 1950  Date of evaluation: 1/28/2021  Time:  4:29 PM     Procedure Summary     Date: 01/28/21 Room / Location: [address]    Anesthesia Start: 1508 Anesthesia Stop: 1629    Procedure: ROBOTIC LAPAROSCOPIC RADICAL PROSTATECTOMY WITH BILATERAL LYMPH NODE DISSECTION AND NON NERVE SPARE (N/A Abdomen) Diagnosis: (C61  MALIGNANT NEOPLASM PROSTATE)    Surgeons: Elpidio Maldonado MD Responsible Provider: Dougie John MD    Anesthesia Type: general ASA Status: 2          Anesthesia Type: general    Avelino Phase I: Avelino Score: 10    Avelino Phase II:      Last vitals: Reviewed and per EMR flowsheets.        Anesthesia Post Evaluation    Patient location during evaluation: PACU  Patient participation: complete - patient participated  Level of consciousness: awake and alert  Airway patency: patent  Nausea & Vomiting: no vomiting and no nausea  Complications: no  Cardiovascular status: hemodynamically stable  Respiratory status: acceptable  Hydration status: stable

## 2022-03-06 ENCOUNTER — HOSPITAL ENCOUNTER (OUTPATIENT)
Dept: CT IMAGING | Age: 72
Discharge: HOME | End: 2022-03-06
Payer: MEDICARE

## 2022-03-06 DIAGNOSIS — F17.200 NICOTINE DEPENDENCE, UNCOMPLICATED, UNSPECIFIED NICOTINE PRODUCT TYPE: ICD-10-CM

## 2022-03-06 DIAGNOSIS — Z87.891 PERSONAL HISTORY OF TOBACCO USE: ICD-10-CM

## 2022-03-06 DIAGNOSIS — F17.211 CIGARETTE NICOTINE DEPENDENCE IN REMISSION: ICD-10-CM

## 2022-03-06 PROCEDURE — 71271 CT THORAX LUNG CANCER SCR C-: CPT

## 2022-03-30 ENCOUNTER — APPOINTMENT (OUTPATIENT)
Dept: GENERAL RADIOLOGY | Age: 72
DRG: 506 | End: 2022-03-30
Payer: MEDICARE

## 2022-03-30 ENCOUNTER — HOSPITAL ENCOUNTER (INPATIENT)
Age: 72
LOS: 3 days | Discharge: HOSPICE | DRG: 506 | End: 2022-04-02
Attending: EMERGENCY MEDICINE | Admitting: INTERNAL MEDICINE
Payer: MEDICARE

## 2022-03-30 DIAGNOSIS — L03.114 CELLULITIS OF LEFT HAND: Primary | ICD-10-CM

## 2022-03-30 PROBLEM — I10 HTN (HYPERTENSION): Status: ACTIVE | Noted: 2022-03-30
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
## 2022-03-30 PROBLEM — E11.9 DM2 (DIABETES MELLITUS, TYPE 2) (HCC): Status: ACTIVE | Noted: 2022-03-30

## 2022-03-30 PROBLEM — F32.A DEPRESSION: Status: ACTIVE | Noted: 2022-03-30

## 2022-03-30 PROBLEM — L03.012 CELLULITIS OF FINGER OF LEFT HAND: Status: ACTIVE | Noted: 2022-03-30

## 2022-03-30 LAB
A/G RATIO: 1.5 (ref 1.1–2.2)
ALBUMIN SERPL-MCNC: 4.4 G/DL (ref 3.4–5)
ALP BLD-CCNC: 65 U/L (ref 40–129)
ALT SERPL-CCNC: 9 U/L (ref 10–40)
ANION GAP SERPL CALCULATED.3IONS-SCNC: 14 MMOL/L (ref 3–16)
AST SERPL-CCNC: 12 U/L (ref 15–37)
BASOPHILS ABSOLUTE: 0.1 K/UL (ref 0–0.2)
BASOPHILS RELATIVE PERCENT: 0.9 %
BILIRUB SERPL-MCNC: 1 MG/DL (ref 0–1)
BUN BLDV-MCNC: 15 MG/DL (ref 7–20)
C-REACTIVE PROTEIN: 52.2 MG/L (ref 0–5.1)
CALCIUM SERPL-MCNC: 9.8 MG/DL (ref 8.3–10.6)
CHLORIDE BLD-SCNC: 100 MMOL/L (ref 99–110)
CO2: 26 MMOL/L (ref 21–32)
CREAT SERPL-MCNC: 0.9 MG/DL (ref 0.8–1.3)
EOSINOPHILS ABSOLUTE: 0.1 K/UL (ref 0–0.6)
EOSINOPHILS RELATIVE PERCENT: 0.5 %
GFR AFRICAN AMERICAN: >60
GFR NON-AFRICAN AMERICAN: >60
GLUCOSE BLD-MCNC: 145 MG/DL (ref 70–99)
GLUCOSE BLD-MCNC: 154 MG/DL (ref 70–99)
HCT VFR BLD CALC: 47.1 % (ref 40.5–52.5)
HEMOGLOBIN: 15.5 G/DL (ref 13.5–17.5)
LACTIC ACID, SEPSIS: 1.6 MMOL/L (ref 0.4–1.9)
LYMPHOCYTES ABSOLUTE: 1.5 K/UL (ref 1–5.1)
LYMPHOCYTES RELATIVE PERCENT: 12.8 %
MCH RBC QN AUTO: 28.9 PG (ref 26–34)
MCHC RBC AUTO-ENTMCNC: 33 G/DL (ref 31–36)
MCV RBC AUTO: 87.5 FL (ref 80–100)
MONOCYTES ABSOLUTE: 0.8 K/UL (ref 0–1.3)
MONOCYTES RELATIVE PERCENT: 7.2 %
NEUTROPHILS ABSOLUTE: 8.9 K/UL (ref 1.7–7.7)
NEUTROPHILS RELATIVE PERCENT: 78.6 %
PDW BLD-RTO: 14.8 % (ref 12.4–15.4)
PERFORMED ON: ABNORMAL
PLATELET # BLD: 355 K/UL (ref 135–450)
PMV BLD AUTO: 7.7 FL (ref 5–10.5)
POTASSIUM REFLEX MAGNESIUM: 3.6 MMOL/L (ref 3.5–5.1)
RBC # BLD: 5.38 M/UL (ref 4.2–5.9)
SEDIMENTATION RATE, ERYTHROCYTE: 55 MM/HR (ref 0–20)
SODIUM BLD-SCNC: 140 MMOL/L (ref 136–145)
TOTAL PROTEIN: 7.3 G/DL (ref 6.4–8.2)
WBC # BLD: 11.3 K/UL (ref 4–11)

## 2022-03-30 PROCEDURE — 73130 X-RAY EXAM OF HAND: CPT

## 2022-03-30 PROCEDURE — 83605 ASSAY OF LACTIC ACID: CPT

## 2022-03-30 PROCEDURE — 6370000000 HC RX 637 (ALT 250 FOR IP): Performed by: INTERNAL MEDICINE

## 2022-03-30 PROCEDURE — 86140 C-REACTIVE PROTEIN: CPT

## 2022-03-30 PROCEDURE — 2580000003 HC RX 258: Performed by: PHYSICIAN ASSISTANT

## 2022-03-30 PROCEDURE — 87070 CULTURE OTHR SPECIMN AEROBIC: CPT

## 2022-03-30 PROCEDURE — 85025 COMPLETE CBC W/AUTO DIFF WBC: CPT

## 2022-03-30 PROCEDURE — 36415 COLL VENOUS BLD VENIPUNCTURE: CPT

## 2022-03-30 PROCEDURE — 87040 BLOOD CULTURE FOR BACTERIA: CPT

## 2022-03-30 PROCEDURE — 87205 SMEAR GRAM STAIN: CPT

## 2022-03-30 PROCEDURE — 80053 COMPREHEN METABOLIC PANEL: CPT

## 2022-03-30 PROCEDURE — 83036 HEMOGLOBIN GLYCOSYLATED A1C: CPT

## 2022-03-30 PROCEDURE — 6360000002 HC RX W HCPCS: Performed by: PHYSICIAN ASSISTANT

## 2022-03-30 PROCEDURE — 99283 EMERGENCY DEPT VISIT LOW MDM: CPT

## 2022-03-30 PROCEDURE — 96367 TX/PROPH/DG ADDL SEQ IV INF: CPT

## 2022-03-30 PROCEDURE — 1200000000 HC SEMI PRIVATE

## 2022-03-30 PROCEDURE — 2580000003 HC RX 258: Performed by: INTERNAL MEDICINE

## 2022-03-30 PROCEDURE — 96365 THER/PROPH/DIAG IV INF INIT: CPT

## 2022-03-30 PROCEDURE — 85652 RBC SED RATE AUTOMATED: CPT

## 2022-03-30 RX ORDER — SODIUM CHLORIDE 9 MG/ML
INJECTION, SOLUTION INTRAVENOUS PRN
Status: DISCONTINUED | OUTPATIENT
Start: 2022-03-30 | End: 2022-04-02 | Stop reason: HOSPADM

## 2022-03-30 RX ORDER — HYDROCHLOROTHIAZIDE 25 MG/1
12.5 TABLET ORAL DAILY
Status: DISCONTINUED | OUTPATIENT
Start: 2022-03-31 | End: 2022-04-02 | Stop reason: HOSPADM

## 2022-03-30 RX ORDER — 0.9 % SODIUM CHLORIDE 0.9 %
500 INTRAVENOUS SOLUTION INTRAVENOUS PRN
Status: DISCONTINUED | OUTPATIENT
Start: 2022-03-30 | End: 2022-04-02 | Stop reason: HOSPADM

## 2022-03-30 RX ORDER — POTASSIUM CHLORIDE 7.45 MG/ML
10 INJECTION INTRAVENOUS PRN
Status: DISCONTINUED | OUTPATIENT
Start: 2022-03-30 | End: 2022-03-30 | Stop reason: SDUPTHER

## 2022-03-30 RX ORDER — ACETAMINOPHEN 650 MG/1
650 SUPPOSITORY RECTAL EVERY 6 HOURS PRN
Status: DISCONTINUED | OUTPATIENT
Start: 2022-03-30 | End: 2022-04-02 | Stop reason: HOSPADM

## 2022-03-30 RX ORDER — POTASSIUM CHLORIDE 20 MEQ/1
40 TABLET, EXTENDED RELEASE ORAL PRN
Status: DISCONTINUED | OUTPATIENT
Start: 2022-03-30 | End: 2022-04-02 | Stop reason: HOSPADM

## 2022-03-30 RX ORDER — PAROXETINE HYDROCHLORIDE 20 MG/1
20 TABLET, FILM COATED ORAL NIGHTLY
Status: DISCONTINUED | OUTPATIENT
Start: 2022-03-31 | End: 2022-04-02 | Stop reason: HOSPADM

## 2022-03-30 RX ORDER — LISINOPRIL 10 MG/1
10 TABLET ORAL DAILY
Status: DISCONTINUED | OUTPATIENT
Start: 2022-03-30 | End: 2022-04-02 | Stop reason: HOSPADM

## 2022-03-30 RX ORDER — AMLODIPINE BESYLATE AND BENAZEPRIL HYDROCHLORIDE 5; 10 MG/1; MG/1
1 CAPSULE ORAL DAILY
Status: DISCONTINUED | OUTPATIENT
Start: 2022-03-30 | End: 2022-03-30 | Stop reason: CLARIF

## 2022-03-30 RX ORDER — POTASSIUM CHLORIDE 20 MEQ/1
40 TABLET, EXTENDED RELEASE ORAL PRN
Status: DISCONTINUED | OUTPATIENT
Start: 2022-03-30 | End: 2022-03-30 | Stop reason: SDUPTHER

## 2022-03-30 RX ORDER — ONDANSETRON 4 MG/1
4 TABLET, ORALLY DISINTEGRATING ORAL EVERY 8 HOURS PRN
Status: DISCONTINUED | OUTPATIENT
Start: 2022-03-30 | End: 2022-04-02 | Stop reason: HOSPADM

## 2022-03-30 RX ORDER — PIOGLITAZONEHYDROCHLORIDE 15 MG/1
45 TABLET ORAL DAILY
Status: DISCONTINUED | OUTPATIENT
Start: 2022-03-31 | End: 2022-04-02 | Stop reason: HOSPADM

## 2022-03-30 RX ORDER — SODIUM CHLORIDE 9 MG/ML
INJECTION, SOLUTION INTRAVENOUS CONTINUOUS
Status: DISCONTINUED | OUTPATIENT
Start: 2022-03-30 | End: 2022-03-31

## 2022-03-30 RX ORDER — ROSUVASTATIN CALCIUM 10 MG/1
10 TABLET, COATED ORAL NIGHTLY
Status: DISCONTINUED | OUTPATIENT
Start: 2022-03-30 | End: 2022-04-02 | Stop reason: HOSPADM

## 2022-03-30 RX ORDER — AMLODIPINE BESYLATE 5 MG/1
5 TABLET ORAL DAILY
Status: DISCONTINUED | OUTPATIENT
Start: 2022-03-30 | End: 2022-04-02 | Stop reason: HOSPADM

## 2022-03-30 RX ORDER — HYDRALAZINE HYDROCHLORIDE 20 MG/ML
10 INJECTION INTRAMUSCULAR; INTRAVENOUS EVERY 6 HOURS PRN
Status: DISCONTINUED | OUTPATIENT
Start: 2022-03-30 | End: 2022-04-02 | Stop reason: HOSPADM

## 2022-03-30 RX ORDER — GLIMEPIRIDE 2 MG/1
2 TABLET ORAL 2 TIMES DAILY WITH MEALS
Status: DISCONTINUED | OUTPATIENT
Start: 2022-03-30 | End: 2022-04-02 | Stop reason: HOSPADM

## 2022-03-30 RX ORDER — INSULIN LISPRO 100 [IU]/ML
0-12 INJECTION, SOLUTION INTRAVENOUS; SUBCUTANEOUS
Status: DISCONTINUED | OUTPATIENT
Start: 2022-03-31 | End: 2022-04-02 | Stop reason: HOSPADM

## 2022-03-30 RX ORDER — SODIUM CHLORIDE 0.9 % (FLUSH) 0.9 %
10 SYRINGE (ML) INJECTION PRN
Status: DISCONTINUED | OUTPATIENT
Start: 2022-03-30 | End: 2022-04-02 | Stop reason: HOSPADM

## 2022-03-30 RX ORDER — ACETAMINOPHEN 325 MG/1
650 TABLET ORAL EVERY 6 HOURS PRN
Status: DISCONTINUED | OUTPATIENT
Start: 2022-03-30 | End: 2022-04-02 | Stop reason: HOSPADM

## 2022-03-30 RX ORDER — DEXTROSE MONOHYDRATE 50 MG/ML
100 INJECTION, SOLUTION INTRAVENOUS PRN
Status: DISCONTINUED | OUTPATIENT
Start: 2022-03-30 | End: 2022-04-02 | Stop reason: HOSPADM

## 2022-03-30 RX ORDER — POTASSIUM CHLORIDE 7.45 MG/ML
10 INJECTION INTRAVENOUS PRN
Status: DISCONTINUED | OUTPATIENT
Start: 2022-03-30 | End: 2022-04-02 | Stop reason: HOSPADM

## 2022-03-30 RX ORDER — INSULIN LISPRO 100 [IU]/ML
0-6 INJECTION, SOLUTION INTRAVENOUS; SUBCUTANEOUS NIGHTLY
Status: DISCONTINUED | OUTPATIENT
Start: 2022-03-30 | End: 2022-04-02 | Stop reason: HOSPADM

## 2022-03-30 RX ORDER — NICOTINE POLACRILEX 4 MG
15 LOZENGE BUCCAL PRN
Status: DISCONTINUED | OUTPATIENT
Start: 2022-03-30 | End: 2022-04-02 | Stop reason: HOSPADM

## 2022-03-30 RX ORDER — DEXTROSE MONOHYDRATE 100 MG/ML
12.5 INJECTION, SOLUTION INTRAVENOUS PRN
Status: DISCONTINUED | OUTPATIENT
Start: 2022-03-30 | End: 2022-04-02 | Stop reason: HOSPADM

## 2022-03-30 RX ORDER — ONDANSETRON 2 MG/ML
4 INJECTION INTRAMUSCULAR; INTRAVENOUS EVERY 6 HOURS PRN
Status: DISCONTINUED | OUTPATIENT
Start: 2022-03-30 | End: 2022-04-02 | Stop reason: HOSPADM

## 2022-03-30 RX ORDER — SENNA AND DOCUSATE SODIUM 50; 8.6 MG/1; MG/1
1 TABLET, FILM COATED ORAL 2 TIMES DAILY
Status: DISCONTINUED | OUTPATIENT
Start: 2022-03-30 | End: 2022-04-02 | Stop reason: HOSPADM

## 2022-03-30 RX ORDER — SODIUM CHLORIDE 0.9 % (FLUSH) 0.9 %
5-40 SYRINGE (ML) INJECTION EVERY 12 HOURS SCHEDULED
Status: DISCONTINUED | OUTPATIENT
Start: 2022-03-30 | End: 2022-04-02 | Stop reason: HOSPADM

## 2022-03-30 RX ADMIN — ROSUVASTATIN CALCIUM 10 MG: 10 TABLET, COATED ORAL at 22:02

## 2022-03-30 RX ADMIN — CEFEPIME HYDROCHLORIDE 2000 MG: 2 INJECTION, POWDER, FOR SOLUTION INTRAVENOUS at 15:20

## 2022-03-30 RX ADMIN — AMLODIPINE BESYLATE 5 MG: 5 TABLET ORAL at 22:02

## 2022-03-30 RX ADMIN — LISINOPRIL 10 MG: 10 TABLET ORAL at 22:02

## 2022-03-30 RX ADMIN — SENNOSIDES AND DOCUSATE SODIUM 1 TABLET: 50; 8.6 TABLET ORAL at 22:02

## 2022-03-30 RX ADMIN — SODIUM CHLORIDE: 9 INJECTION, SOLUTION INTRAVENOUS at 22:08

## 2022-03-30 RX ADMIN — INSULIN LISPRO 1 UNITS: 100 INJECTION, SOLUTION INTRAVENOUS; SUBCUTANEOUS at 22:25

## 2022-03-30 RX ADMIN — VANCOMYCIN HYDROCHLORIDE 1250 MG: 10 INJECTION, POWDER, LYOPHILIZED, FOR SOLUTION INTRAVENOUS at 16:02

## 2022-03-30 RX ADMIN — Medication 10 ML: at 22:04

## 2022-03-30 ASSESSMENT — PAIN SCALES - GENERAL
PAINLEVEL_OUTOF10: 10
PAINLEVEL_OUTOF10: 0
PAINLEVEL_OUTOF10: 0

## 2022-03-30 ASSESSMENT — ENCOUNTER SYMPTOMS
BACK PAIN: 0
NAUSEA: 0
ABDOMINAL PAIN: 0
COLOR CHANGE: 1
SHORTNESS OF BREATH: 0
CHEST TIGHTNESS: 0
VOMITING: 0
COUGH: 0
RESPIRATORY NEGATIVE: 1
CONSTIPATION: 0
DIARRHEA: 0

## 2022-03-30 NOTE — ED PROVIDER NOTES
In addition to the advanced practice provider, I personally saw Ama Sierra and performed a substantive portion of the visit including all aspects of the medical decision making. Medical Decision Making  Cat bite x 1week, on augmentin, now worse  Needs IV ABX  On exam erythematous and inflamed left hand but no obvious discrete drainable fluid collection      IV ABX, admit      Screenings     Victoriano Coma Scale  Eye Opening: Spontaneous  Best Verbal Response: Oriented  Best Motor Response: Obeys commands  [place]ird Coma Scale Score: 15               Patient Referrals:  No follow-up provider specified. Discharge Medications:  Current Discharge Medication List          FINAL IMPRESSION  1. Cellulitis of left hand        Blood pressure 119/69, pulse 84, temperature 98.2 °F (36.8 °C), temperature source Oral, resp. rate 16, height 5' 10\" (1.778 m), weight 181 lb 4.8 oz (82.2 kg), SpO2 94 %. For further details of [facility] emergency department encounter, please see documentation by advanced practice providerRcaheal.        Jose Garcia MD  03/30/22 3921

## 2022-03-30 NOTE — PROGRESS NOTES
Clinical Pharmacy Note: Pharmacy to Dose Vancomycin    Ama Sierra is a 74 y.o. male started on Vancomycin for cellulitis; consult received from Dr. Cuate Galindo to manage therapy. Also receiving the following antibiotics: cefepime. Goal AUC: 400-600 mg/L*hr  Goal Trough Level: 10-15 mcg/mL    Assessment/Plan:  A 1250 mg loading dose was given on 3/30 at 1600  Initiate vancomycin 1500 mg IV every 24 hours. Bayesian modeling predicts an AUC of 431 mg/L*hr and a trough of 11.7 mcg/mL at steady state concentration. A vancomycin random level has been ordered for 04/01 at 0600  Changes in regimen will be determined based on culture results, renal function, and clinical response. Pharmacy will continue to monitor and adjust regimen as necessary. Allergies:  Patient has no known allergies. Recent Labs     03/30/22  1434   CREATININE 0.9       Recent Labs     03/30/22  1434   WBC 11.3*       Ht Readings from Last 1 Encounters:   03/30/22 5' 10\" (1.778 m)        Wt Readings from Last 1 Encounters:   03/30/22 181 lb 4.8 oz (82.2 kg)         Estimated Creatinine Clearance: 74 mL/min (based on SCr of 0.9 mg/dL).       Thank you for the consult,    Nilson Bennett RPh

## 2022-03-30 NOTE — H&P
History and Physical  Dr. Yunier Carvalho  3/30/2022    PCP: Joseph Willis MD    Cc:   Chief Complaint   Patient presents with    Cellulitis     pt with cat bite on sunday now with cellulitis to right hand, been on antibiotics, sent by Dr. Yunier Carvalho       HPI:  Renee Koch is a 74 y.o. male who has a past medical history of Diabetes mellitus ([facility] [place] [address] Hyperlipidemia, and Hypertension. Patient presents with Cellulitis of finger of left hand. HPI  (1-3 for expanded problem focused, ?4 for detailed/comprehensive)     74 y.o. male with past medical history of diabetes, hypertension hyperlipidemia who presents the ED with complaint of cellulitis to his left hand. Patient states he is right-hand dominant. States he was bit by a cat to his left hand contrary to nursing triage note on Sunday. Patient states he called his PCP who prescribed him antibiotics with Augmentin. He has been taking. States he has had worsening swelling, redness, warmth to his left hand now extending up to his forearm to about the elbow. States he has had some purulent drainage draining from the bite wound over the past 24 hours. He was told to come to the ED for further evaluation and treatment. Told he most likely require admission. He denies any fever chills. States he has decreased range of motion strength secondary to swelling. He says it aching pain rated 10/10. Denies foreign body sensation. Wound now much more edematours  Pus coming from wound  To be admitted for iv abx  ID and ortho asked to see    Problem list of hospitalization thus far: Active Hospital Problems    Diagnosis     Cellulitis of finger of left hand [Z16.030]     Cat bite [W55.01XA]     HTN (hypertension) [I10]     Depression [F32. A]     DM2 (diabetes mellitus, type 2) ([facility] [place] [address] [E11.9]          Review of Systems: (1 system for EPF, 2-9 for detailed, 10+ for comprehensive)  Constitutional: Negative for chills and fever.      HENT: Negative for dental problem, nosebleeds and rhinorrhea. Eyes: Negative for photophobia and visual disturbance. Respiratory: Negative for cough, chest tightness and shortness of breath. Cardiovascular: Negative for chest pain and leg swelling. Gastrointestinal: Negative for diarrhea, nausea and vomiting. Endocrine: Negative for polydipsia and polyphagia. Genitourinary: Negative for frequency, hematuria and urgency. Musculoskeletal: Negative for back pain and myalgias. Skin: +erythema/warmth  Allergic/Immunologic: Negative for food allergies. Neurological: Negative for dizziness, seizures, syncope and facial asymmetry. Hematological: Negative for adenopathy. Psychiatric/Behavioral: Negative for dysphoric mood. The patient is not nervous/anxious. Past Medical History:   Past Medical History:   Diagnosis Date    Diabetes mellitus ([facility] [address]     Hyperlipidemia     Hypertension        Past Surgical History:   Past Surgical History:   Procedure Laterality Date    PROSTATECTOMY N/A 1/28/2021    ROBOTIC LAPAROSCOPIC RADICAL PROSTATECTOMY WITH BILATERAL LYMPH NODE DISSECTION AND NON NERVE SPARE performed by Eve Reyes MD at [address] [place] [address] History:   Social History     Tobacco History     Smoking Status  Current Every Day Smoker Smoking Frequency  1 pack/day    Smokeless Tobacco Use  Never Used          Alcohol History     Alcohol Use Status  Never          Drug Use     Drug Use Status  Never          Sexual Activity     Sexually Active  Not Asked                Fam History: History reviewed. No pertinent family history. PFSH: The above PMHx, PSHx, SocHx, FamHx has been reviewed by myself. (1 area for detailed, 2-3 for comprehensive)      Code Status: No Order    Meds - following list of home medications fromelectronic chart has been reviewed by myself  Prior to Admission medications    Medication Sig Start Date End Date Taking?  Authorizing Provider   UNKNOWN TO PATIENT Another diabetic pill Yes Historical Provider, MD   ondansetron (ZOFRAN) 4 MG tablet Take 1 tablet by mouth 3 times daily as needed for Nausea or Vomiting 1/28/21   Collette Squires, MD   senna-docusate ([facility]) 8.6-50 MG per tablet Take 1 tablet by mouth 2 times daily 1/28/21   Collette Squires, MD   metFORMIN (GLUCOPHAGE) 1000 MG tablet Take 1,000 mg by mouth 2 times daily (with meals)    Historical Provider, MD   glimepiride (AMARYL) 2 MG tablet Take 2 mg by mouth 2 times daily    Historical Provider, MD   pioglitazone (ACTOS) 45 MG tablet Take 45 mg by mouth daily    Historical Provider, MD   hydroCHLOROthiazide (MICROZIDE) 12.5 MG capsule Take 12.5 mg by mouth daily    Historical Provider, MD   amLODIPine-benazepril (LOTREL) 5-10 MG per capsule Take 1 capsule by mouth daily    Historical Provider, MD   PARoxetine (PAXIL) 20 MG tablet Take 20 mg by mouth nightly    Historical Provider, MD   Rosuvastatin Calcium 10 MG CPSP Take by mouth nightly    Historical Provider, MD         No Known Allergies          EXAM: (2-7 system for EPF/Detailed, ?8 for Comprehensive)  /72   Pulse 87   Temp 98.3 °F (36.8 °C) (Oral)   Resp 14   Ht 5' 10\" (1.778 m)   Wt 181 lb 4.8 oz (82.2 kg)   SpO2 98%   BMI 26.01 kg/m²   Constitutional: vitals as above: alert, appears stated age and cooperative    Psychiatric: normal insight and judgment, oriented to person, place, time, and general circumstances    Head: Normocephalic, without obvious abnormality, atraumatic    Eyes:lids and lashes normal, conjunctivae and sclerae normal and pupils equal, round, reactive to light and accomodation    EMNT: external ears normal, nares midline    Neck: no carotid bruit, supple, symmetrical, trachea midline and thyroid not enlarged, symmetric, no tenderness/mass/nodules     Respiratory: clear to auscultation and percussion bilaterally with normal respiratory effort    Cardiovascular: normal rate, regular rhythm, normal S1 and S2 and no murmurs Gastrointestinal: soft, non-tender, non-distended, normal bowel sounds, no masses or organomegaly    Extremities: no clubbing, see pic below  Skin:No rashes or nodules noted. Neurologic:negative            LABS:  Labs Reviewed   CBC WITH AUTO DIFFERENTIAL - Abnormal; Notable for the following components:       Result Value    WBC 11.3 (*)     Neutrophils Absolute 8.9 (*)     All other components within normal limits   COMPREHENSIVE METABOLIC PANEL W/ REFLEX TO MG FOR LOW K - Abnormal; Notable for the following components:    Glucose 145 (*)     ALT 9 (*)     AST 12 (*)     All other components within normal limits   SEDIMENTATION RATE - Abnormal; Notable for the following components:    Sed Rate 55 (*)     All other components within normal limits   C-REACTIVE PROTEIN - Abnormal; Notable for the following components:    CRP 52.2 (*)     All other components within normal limits   CULTURE, BLOOD 1   CULTURE, BLOOD 2   CULTURE, WOUND    Narrative:     ORDER#: [id number]                          ORDERED BY: KULWINDER MARTINEZ  SOURCE: Abscess                            COLLECTED:  03/30/22 14:34  ANTIBIOTICS AT [facility].:                      RECEIVED :  03/30/22 14:40   LACTATE, SEPSIS   LACTATE, SEPSIS         IMAGING:  Imaging results from the ER have been reviewed in the computerized chart. XR HAND LEFT (MIN 3 VIEWS)    Result Date: 3/30/2022  EXAMINATION: THREE XRAY VIEWS OF THE LEFT HAND 3/30/2022 2:42 pm COMPARISON: None. HISTORY: ORDERING SYSTEM PROVIDED HISTORY: cellulitis - cat bite TECHNOLOGIST PROVIDED HISTORY: Reason for exam:->cellulitis - cat bite FINDINGS: There is no evidence of acute fracture. There is normal alignment. No acute joint abnormality. No focal osseous lesion. No focal soft tissue abnormality. No acute osseous abnormality.  No foreign body identified     CT Lung Screen (Initial or Annual)    Result Date: 3/7/2022  EXAMINATION: LOW DOSE SCREENING CT OF THE CHEST WITHOUT CONTRAST 3/6/2022 12:38 pm TECHNIQUE: Low dose lung cancer screening CT of the chest was performed without the administration of intravenous contrast.  Multiplanar reformatted images are provided for review. Dose modulation, iterative reconstruction, and/or weight based adjustment of the mA/kV was utilized to reduce the radiation dose to as low as reasonably achievable. COMPARISON: None. HISTORY: ORDERING SYSTEM PROVIDED HISTORY: Personal history of tobacco use TECHNOLOGIST PROVIDED HISTORY: Age: 66 y.o. Smoking History: Social History Tobacco Use Smoking status: Current Every Day Smoker Packs/day: 1.00 Smokeless tobacco: Never Used Vaping Use Vaping Use: Not on file Alcohol use: Never Drug use: Never Pack years: 0 No previous lung cancer screening exam LDCT,CHEST Is there documentation of shared decision making? ->Yes Does the patient show any signs or symptoms of lung cancer? ->No Is this the first (baseline) CT or an annual exam?->Baseline Is this a low dose CT or a routine CT?->Low Dose CT Smoking Status? ->Former Smoker Date quit smoking? (must be within 15 years)->1/1/09 Smoking packs per day?->2 Years smoking?->30 CTDlvol (mGy)->1.18 DLP (mGy*cm)->45.03 Reconstructed image width (mm)->2 Reason for Exam: Personal history of tobacco use, Cigarette nicotine dependence in remission, Nicotine dependence, uncomplicated, unspecified nicotine product type FINDINGS: Mediastinum:  Atherosclerotic change seen in aorta. Moderate coronary artery calcification is seen. Aortic valve calcification is seen. No pericardial effusion. No pericardial calcification is seen. Small hiatal hernia seen. There is nonspecific thickening at the GE junction. There is a questionable nodule projecting inferiorly off the left lobe of thyroid measuring 2.4 cm Lungs/Pleura:  Mild underlying emphysema is seen. No large blebs or bulla. No obstructing endobronchial lesions are seen. Calcified granuloma seen in the right lower lobe.   Small noncalcified pulmonary nodule seen in right lower lobe, measuring 4 mm. Subtle ground-glass nodule seen in right upper lobe, measuring 5 mm Upper Abdomen: There is mild thickening and hypodensity seen in the adrenal glands, measuring up to 2.0 cm in size on the left, likely adenoma. Moderate stool seen in the visualized colon. Soft Tissues/Bones: Spurring is seen in the spine. Spurring is seen in the shoulder joints. Sebaceous cyst is seen posteriorly in the chest wall on the right, measuring 2.4 cm     Mild emphysema with small ground-glass nodule and noncalcified pulmonary nodule. Calcified granuloma also seen. Moderate coronary artery calcification Questionable thyroid nodule on the left. Based on size, ultrasound could be considered for further characterization LUNG RADS: Per ACR Lung-RADS Version 1.1 Category 2, Benign appearance or behavior. Management:  Continue annual lung screening with LDCT in 12 months. RECOMMENDATIONS: Unavailable         EKG:     Lab Results   Component Value Date    GLUCOSE 145 03/30/2022     Lab Results   Component Value Date    POCGLU 197 01/28/2021     /72   Pulse 87   Temp 98.3 °F (36.8 °C) (Oral)   Resp 14   Ht 5' 10\" (1.778 m)   Wt 181 lb 4.8 oz (82.2 kg)   SpO2 98%   BMI 26.01 kg/m²     MEDICAL DECISION MAKING:    Principal Problem:    Cellulitis of finger of left hand -Established problem. Uncontrolled. Failed outpt abx for cat bite  Plan: admit, iv vanc/cefepime. Ortho to see for poss I+D. ID consulted for guidance for abx  Active Problems:    Cat bite -Established problem. Uncontrolled. Plan: as above    HTN (hypertension) -Established problem. Stable. 115/72  Plan: Continue present orders/plan. Depression -Established problem. Stable. Plan: cont paxil    DM2 (diabetes mellitus, type 2) ([facility] [address] -Established problem. Stable. Plan: Patient placed on controlled carbohydrate diet.  Fingerstick sugars to be checked to monitor for both hypoglycemia as

## 2022-03-31 ENCOUNTER — ANESTHESIA EVENT (OUTPATIENT)
Dept: OPERATING ROOM | Age: 72
DRG: 506 | End: 2022-03-31
Payer: MEDICARE

## 2022-03-31 PROBLEM — L03.114 CELLULITIS OF LEFT HAND: Status: ACTIVE | Noted: 2022-03-30

## 2022-03-31 LAB
A/G RATIO: 1.8 (ref 1.1–2.2)
ALBUMIN SERPL-MCNC: 3.9 G/DL (ref 3.4–5)
ALP BLD-CCNC: 53 U/L (ref 40–129)
ALT SERPL-CCNC: 7 U/L (ref 10–40)
ANION GAP SERPL CALCULATED.3IONS-SCNC: 13 MMOL/L (ref 3–16)
AST SERPL-CCNC: 11 U/L (ref 15–37)
BASOPHILS ABSOLUTE: 0.1 K/UL (ref 0–0.2)
BASOPHILS RELATIVE PERCENT: 0.7 %
BILIRUB SERPL-MCNC: 1.1 MG/DL (ref 0–1)
BUN BLDV-MCNC: 18 MG/DL (ref 7–20)
CALCIUM SERPL-MCNC: 9.1 MG/DL (ref 8.3–10.6)
CHLORIDE BLD-SCNC: 105 MMOL/L (ref 99–110)
CO2: 25 MMOL/L (ref 21–32)
CREAT SERPL-MCNC: 0.8 MG/DL (ref 0.8–1.3)
EOSINOPHILS ABSOLUTE: 0.2 K/UL (ref 0–0.6)
EOSINOPHILS RELATIVE PERCENT: 2.6 %
ESTIMATED AVERAGE GLUCOSE: 145.6 MG/DL
GFR AFRICAN AMERICAN: >60
GFR NON-AFRICAN AMERICAN: >60
GLUCOSE BLD-MCNC: 100 MG/DL (ref 70–99)
GLUCOSE BLD-MCNC: 102 MG/DL (ref 70–99)
GLUCOSE BLD-MCNC: 109 MG/DL (ref 70–99)
GLUCOSE BLD-MCNC: 142 MG/DL (ref 70–99)
GLUCOSE BLD-MCNC: 146 MG/DL (ref 70–99)
HBA1C MFR BLD: 6.7 %
HCT VFR BLD CALC: 43.3 % (ref 40.5–52.5)
HEMOGLOBIN: 14.2 G/DL (ref 13.5–17.5)
LYMPHOCYTES ABSOLUTE: 1.6 K/UL (ref 1–5.1)
LYMPHOCYTES RELATIVE PERCENT: 19.4 %
MCH RBC QN AUTO: 28.6 PG (ref 26–34)
MCHC RBC AUTO-ENTMCNC: 32.7 G/DL (ref 31–36)
MCV RBC AUTO: 87.5 FL (ref 80–100)
MONOCYTES ABSOLUTE: 0.7 K/UL (ref 0–1.3)
MONOCYTES RELATIVE PERCENT: 8.5 %
NEUTROPHILS ABSOLUTE: 5.7 K/UL (ref 1.7–7.7)
NEUTROPHILS RELATIVE PERCENT: 68.8 %
PDW BLD-RTO: 15 % (ref 12.4–15.4)
PERFORMED ON: ABNORMAL
PLATELET # BLD: 292 K/UL (ref 135–450)
PMV BLD AUTO: 7.5 FL (ref 5–10.5)
POTASSIUM REFLEX MAGNESIUM: 3.8 MMOL/L (ref 3.5–5.1)
RBC # BLD: 4.95 M/UL (ref 4.2–5.9)
SODIUM BLD-SCNC: 143 MMOL/L (ref 136–145)
TOTAL PROTEIN: 6.1 G/DL (ref 6.4–8.2)
VANCOMYCIN RANDOM: 5.8 UG/ML
WBC # BLD: 8.3 K/UL (ref 4–11)

## 2022-03-31 PROCEDURE — 85025 COMPLETE CBC W/AUTO DIFF WBC: CPT

## 2022-03-31 PROCEDURE — 6370000000 HC RX 637 (ALT 250 FOR IP): Performed by: INTERNAL MEDICINE

## 2022-03-31 PROCEDURE — 2580000003 HC RX 258: Performed by: INTERNAL MEDICINE

## 2022-03-31 PROCEDURE — 1200000000 HC SEMI PRIVATE

## 2022-03-31 PROCEDURE — 90715 TDAP VACCINE 7 YRS/> IM: CPT | Performed by: INTERNAL MEDICINE

## 2022-03-31 PROCEDURE — 90471 IMMUNIZATION ADMIN: CPT | Performed by: INTERNAL MEDICINE

## 2022-03-31 PROCEDURE — 6360000002 HC RX W HCPCS: Performed by: INTERNAL MEDICINE

## 2022-03-31 PROCEDURE — 36415 COLL VENOUS BLD VENIPUNCTURE: CPT

## 2022-03-31 PROCEDURE — 80202 ASSAY OF VANCOMYCIN: CPT

## 2022-03-31 PROCEDURE — 99223 1ST HOSP IP/OBS HIGH 75: CPT | Performed by: INTERNAL MEDICINE

## 2022-03-31 PROCEDURE — 80053 COMPREHEN METABOLIC PANEL: CPT

## 2022-03-31 PROCEDURE — 99231 SBSQ HOSP IP/OBS SF/LOW 25: CPT | Performed by: NURSE PRACTITIONER

## 2022-03-31 RX ORDER — LACTOBACILLUS RHAMNOSUS GG 10B CELL
1 CAPSULE ORAL 2 TIMES DAILY WITH MEALS
Status: DISCONTINUED | OUTPATIENT
Start: 2022-03-31 | End: 2022-04-02 | Stop reason: HOSPADM

## 2022-03-31 RX ORDER — LINEZOLID 600 MG/1
600 TABLET, FILM COATED ORAL EVERY 12 HOURS SCHEDULED
Status: DISCONTINUED | OUTPATIENT
Start: 2022-03-31 | End: 2022-04-02

## 2022-03-31 RX ADMIN — PAROXETINE HYDROCHLORIDE 20 MG: 20 TABLET, FILM COATED ORAL at 20:42

## 2022-03-31 RX ADMIN — ENOXAPARIN SODIUM 40 MG: 40 INJECTION SUBCUTANEOUS at 08:40

## 2022-03-31 RX ADMIN — INSULIN LISPRO 2 UNITS: 100 INJECTION, SOLUTION INTRAVENOUS; SUBCUTANEOUS at 16:37

## 2022-03-31 RX ADMIN — PIPERACILLIN AND TAZOBACTAM 4500 MG: 4; .5 INJECTION, POWDER, LYOPHILIZED, FOR SOLUTION INTRAVENOUS at 13:50

## 2022-03-31 RX ADMIN — PIPERACILLIN AND TAZOBACTAM 3375 MG: 3; .375 INJECTION, POWDER, LYOPHILIZED, FOR SOLUTION INTRAVENOUS at 22:38

## 2022-03-31 RX ADMIN — SENNOSIDES AND DOCUSATE SODIUM 1 TABLET: 50; 8.6 TABLET ORAL at 08:40

## 2022-03-31 RX ADMIN — LINEZOLID 600 MG: 600 TABLET, FILM COATED ORAL at 20:42

## 2022-03-31 RX ADMIN — SENNOSIDES AND DOCUSATE SODIUM 1 TABLET: 50; 8.6 TABLET ORAL at 20:42

## 2022-03-31 RX ADMIN — INSULIN LISPRO 2 UNITS: 100 INJECTION, SOLUTION INTRAVENOUS; SUBCUTANEOUS at 11:43

## 2022-03-31 RX ADMIN — Medication 1 CAPSULE: at 16:37

## 2022-03-31 RX ADMIN — TETANUS TOXOID, REDUCED DIPHTHERIA TOXOID AND ACELLULAR PERTUSSIS VACCINE, ADSORBED 0.5 ML: 5; 2.5; 8; 8; 2.5 SUSPENSION INTRAMUSCULAR at 17:36

## 2022-03-31 RX ADMIN — ROSUVASTATIN CALCIUM 10 MG: 10 TABLET, COATED ORAL at 20:42

## 2022-03-31 RX ADMIN — CEFEPIME HYDROCHLORIDE 2000 MG: 2 INJECTION, POWDER, FOR SOLUTION INTRAVENOUS at 04:51

## 2022-03-31 RX ADMIN — HYDROCHLOROTHIAZIDE 12.5 MG: 25 TABLET ORAL at 08:39

## 2022-03-31 ASSESSMENT — ENCOUNTER SYMPTOMS
NAUSEA: 0
EYE REDNESS: 0
SHORTNESS OF BREATH: 0
DIARRHEA: 0
SORE THROAT: 0
CONSTIPATION: 0
ABDOMINAL PAIN: 0
BACK PAIN: 0
COUGH: 0
TROUBLE SWALLOWING: 0
EYE DISCHARGE: 0
WHEEZING: 0
RHINORRHEA: 0

## 2022-03-31 ASSESSMENT — PAIN SCALES - GENERAL
PAINLEVEL_OUTOF10: 0
PAINLEVEL_OUTOF10: 0

## 2022-03-31 ASSESSMENT — LIFESTYLE VARIABLES: SMOKING_STATUS: 1

## 2022-03-31 NOTE — CARE COORDINATION
Discharge Planning Note:    Chart reviewed and it appears that patient has minimal needs for discharge at this time. Discussed with patient and requested that case management be notified if discharge needs are identified.     - Current discharge plan is for the patient to return home with no needs. Case management will continue to follow progress and update discharge plan as needed.     Risk of Readmission Score: 10%    MAI Alston RN      Phone: [phone]

## 2022-03-31 NOTE — CONSULTS
[facility] Orthopedic Surgery  Consult Note    Patient: Susan Us  Admit Date: 3/30/2022  Requesting Physician: Eduardo Morales MD  Room: [address] [place], [place] [id number]    Chief complaint: LEFT hand cat bite    HPI: Susan Us is a 74 y.o. male who presented to [facility] [place] ER yesterday with complaints of ongoing left hand swelling and drainage after is cat bit him on Sunday, 3/27/22. He did seek evaluation in Urgent Care and was placed on Augmentin. Unfortunately his swelling progressed and he came to ER. Admitted overnight for IV abx. He report significant imrovement in swelling and his ROM since being admitted. He is RHD and retired. He smokes 1/2 pk/day. Non-insulin dependent DM II with A1c of 6.4     Describes pain in the left hand of mdoerate intensity and of throbbing nature since Sunday which is relieved by IV Abx. Denies new numbness/tingling. Imaging review of the left hand via plain films demonstrated: soft tissue swelling, no foreign body, fracture, or gas or signs of osteomyelitis. Patient lives with wife in private home and uses no assistive devices to ambulate. Medical History:  Past Medical History:   Diagnosis Date    Diabetes mellitus ([address]     Hyperlipidemia     Hypertension      Past Surgical History:   Procedure Laterality Date    PROSTATECTOMY N/A 1/28/2021    ROBOTIC LAPAROSCOPIC RADICAL PROSTATECTOMY WITH BILATERAL LYMPH NODE DISSECTION AND NON NERVE SPARE performed by Armando Joseph MD at [address] [place] [address] History:    reports that he has been smoking. He has been smoking about 1.00 pack per day. He has never used smokeless tobacco.    Family History:  History reviewed. No pertinent family history.     Medications:  ALL MEDICATIONS HAVE BEEN REVIEWED:  Scheduled:   glimepiride  2 mg Oral BID WC    pioglitazone  45 mg Oral Daily    hydroCHLOROthiazide  12.5 mg Oral Daily    PARoxetine  20 mg Oral Nightly    rosuvastatin  10 mg Oral Nightly    sennosides-docusate sodium  1 tablet Oral BID    sodium chloride flush  5-40 mL IntraVENous 2 times per day    enoxaparin  40 mg SubCUTAneous Daily    insulin lispro  0-12 Units SubCUTAneous TID WC    insulin lispro  0-6 Units SubCUTAneous Nightly    vancomycin  1,500 mg IntraVENous Q24H    cefepime  2,000 mg IntraVENous Q12H    amLODIPine  5 mg Oral Daily    And    lisinopril  10 mg Oral Daily     Continuous:   sodium chloride Stopped (03/31/22 045)    sodium chloride      dextrose      dextrose       PRN:sodium chloride flush, sodium chloride, potassium chloride **OR** potassium alternative oral replacement **OR** potassium chloride, ondansetron **OR** ondansetron, magnesium hydroxide, acetaminophen **OR** acetaminophen, hydrALAZINE, sodium chloride, glucose, dextrose, glucagon (rDNA), dextrose    Allergies: No Known Allergies    Review of Systems:  Constitutional: Negative for fever, chills, fatigue. Skin:  Negative for pruritis, rash  Eyes: Negative for photophobia and visual disturbance. ENT:  Negative for rhinorrhea, epistaxis, sore throat  Respiratory:  Negative for cough and shortness of breath. Cardiovascular: Negative for chest pain. Gastrointestinal: Negative for nausea, vomiting, diarrhea. Genitourinary: Negative for dysuria and difficulty urinating. Neurological: Negative for confusion, dysarthria, tremors, seizures. Psychiatric:  Negative for depression or anxiety  Musculoskeletal:  Positive for LEFT     Objective:  Vitals:    03/31/22 0830   BP: 133/72   Pulse: 87   Resp: 16   Temp: 97.9 °F (36.6 °C)   SpO2: 94%      Physical Examination:  GENERAL: No apparent distress, well-nourished  SKIN:  Warm and dry  EYES: Nonicteric. ENT: Mucous membranes moist  HEAD: Normocephalic, atraumatic  RESPIRATORY: Resp easy and unlabored  CARDIOVASCULAR: Regular rate and rhythm  GI: Abdomen soft, nontender  NEURO: Awake and alert.   No speech defect  PSYCHIATRIC: Appropriate affect; not agitated  MUSCULOSKELETAL:  LEFT hand  Inspection: On exam there are two puncture sites over the radial side of the MPJ index finger without erythema or drainage. He has one large puncture site over the dorsal aspect of the index MPJ with scab - no active drainage. He has obvious swelling and erythema abut the dorsal aspect of the index MPJ and into the hand - not beyond the wrist.  No apparent fulid collection noted on exam.  There is only mild pain to palpation of the index MPJ around the puncture site. He has nonpainful active and passive ROM of the index finger MPJ. After soaking for 15 minutes, able to milk purulent material from the dorsal drainage site over index MPJ. Motor: Intact flexion and extension with decreased ROM secondary to swelling (imrpoved from yesterday). Sensation: Grossly intact to light touch throughout the left upper extremity in all nervedistributions. Vascular:  2+ left radia pulse. With brisk cap refill in all digits. Labs reviewed:  Recent Labs     03/30/22  1434 03/31/22  0501   WBC 11.3* 8.3   HGB 15.5 14.2   HCT 47.1 43.3    292     Recent Labs     03/30/22  1434 03/31/22  0500    143   K 3.6 3.8    105   CO2 26 25   BUN 15 18   CREATININE 0.9 0.8   GLUCOSE 145* 100*   CALCIUM 9.8 9.1     No results for input(s): INR, PROTIME in the last 72 hours. No results found for: Coretha Young, PH, PHUR, LABSPEC, GLUCOSEU, BLOODU, LEUKOCYTESUR, NITRITE, BILIRUBINUR, UROBILINOGEN, RBCUA, WBCUA, BACTERIA, AMORPHOUS, CRYSTAL    Imaging:  XR HAND LEFT (MIN 3 VIEWS)   Final Result   No acute osseous abnormality. No foreign body identified             IMPRESSION:  LEFT hand cat bite with cellulitis  Tobacco use disorder  Principal Problem:    Cellulitis of finger of left hand  Active Problems:    Cat bite    HTN (hypertension)    Depression    DM2 (diabetes mellitus, type 2) ([facility])  Resolved Problems:    * No resolved hospital problems. *      RECOMMENDATIONS:  We discussed both operative and non-operative treatments. Given his clinical improvement thus far with IV abx, we will hold off on ID today (he has already eaten anyhow). Will make him NPO at midnight with prompt re-evaluation in the morning with the potential for LEFT hand index MPJ I&D with Dr. Bryce Snell tomorrow, 12:00pm.    - NPO after midnight  - TID warm soapy water soaks  - Pain control  - DVT prophylaxis: OK for lovenox today. (Hold anti-coags on day of surgery). - Appreciate pre-op clearance from medicine team  - Verify surgical consent  - IV abx per medicine (cefepime and vanc)  - Dispo: await repeat exam tomorrow morning after another day IV abx    Patient has been counseled about the detrimental effects of smoking and the need to eliminate or significantly decrease their tobacco use. I would suggest discussing this issue with their medical doctor. I would also highly recommend the immediate cessation of all forms of tobacco use. I have reviewed imaging and plan with Dr. Bryce Snell.       GA Ward - CNP  3/31/2022  8:49 AM

## 2022-03-31 NOTE — PROGRESS NOTES
Progress Note - Dr. Gomes Person - Internal Medicine  PCP: Victorino Mota MD [address] [facility] / [facility] Tonio Garza [phone]    Hospital Day: 1  Code Status: Full Code  Current Diet: ADULT DIET; Regular; 4 carb choices (60 gm/meal)  Diet NPO        CC: follow up on medical issues    Subjective:   Jamee Mcarthur is a 74 y.o. male. Pt seen and examined  Chart reviewed since last visit, labs and imaging below        Doing ok  Pt seems to be doing better  Able to make a semi-fist with hand (could not do yest)  Case d/w zeus alvarado NP - plan for poss I+D tomorrow based on exam  Awaiting ID eval for abx recs    He denies chest pain, denies shortness of breath, denies nausea,  denies emesis. 10 system Review of Systems is reviewed with patient, and pertinent positives are noted in HPI above . Otherwise, Review of systems is negative. I have reviewed the patient's medical and social history in detail and updated the computerized patient record. To recap: He  has a past medical history of Diabetes mellitus ([facility] [address] Hyperlipidemia, and Hypertension. . He  has a past surgical history that includes Prostatectomy (N/A, 1/28/2021). Juni Stark He  reports that he has been smoking. He has been smoking about 1.00 pack per day. He has never used smokeless tobacco. He reports that he does not drink alcohol and does not use drugs. .        Active Hospital Problems    Diagnosis Date Noted    Cellulitis of finger of left hand [W16.055] 03/30/2022    Cat bite [W55.01XA] 03/30/2022    HTN (hypertension) [I10] 03/30/2022    Depression [F32. A] 03/30/2022    DM2 (diabetes mellitus, type 2) ([facility] [address] [E11.9] 03/30/2022       Current Facility-Administered Medications: glimepiride (AMARYL) tablet 2 mg, 2 mg, Oral, BID WC  pioglitazone (ACTOS) tablet 45 mg, 45 mg, Oral, Daily  hydroCHLOROthiazide (HYDRODIURIL) tablet 12.5 mg, 12.5 mg, Oral, Daily  PARoxetine (PAXIL) tablet 20 mg, 20 mg, Oral, Nightly  rosuvastatin (CRESTOR) tablet 10 mg, 10 mg, Oral, Nightly  sennosides-docusate sodium (SENOKOT-S) 8.6-50 MG tablet 1 tablet, 1 tablet, Oral, BID  0.9 % sodium chloride infusion, , IntraVENous, Continuous  sodium chloride flush 0.9 % injection 5-40 mL, 5-40 mL, IntraVENous, 2 times per day  sodium chloride flush 0.9 % injection 10 mL, 10 mL, IntraVENous, PRN  0.9 % sodium chloride infusion, , IntraVENous, PRN  potassium chloride (KLOR-CON M) extended release tablet 40 mEq, 40 mEq, Oral, PRN **OR** potassium bicarb-citric acid (EFFER-K) effervescent tablet 40 mEq, 40 mEq, Oral, PRN **OR** potassium chloride 10 mEq/100 mL IVPB (Peripheral Line), 10 mEq, IntraVENous, PRN  enoxaparin (LOVENOX) injection 40 mg, 40 mg, SubCUTAneous, Daily  ondansetron (ZOFRAN-ODT) disintegrating tablet 4 mg, 4 mg, Oral, Q8H PRN **OR** ondansetron (ZOFRAN) injection 4 mg, 4 mg, IntraVENous, Q6H PRN  magnesium hydroxide (MILK OF MAGNESIA) 400 MG/5ML suspension 30 mL, 30 mL, Oral, Daily PRN  acetaminophen (TYLENOL) tablet 650 mg, 650 mg, Oral, Q6H PRN **OR** acetaminophen (TYLENOL) suppository 650 mg, 650 mg, Rectal, Q6H PRN  hydrALAZINE (APRESOLINE) injection 10 mg, 10 mg, IntraVENous, Q6H PRN  0.9 % sodium chloride bolus, 500 mL, IntraVENous, PRN  insulin lispro (1 Unit Dial) 0-12 Units, 0-12 Units, SubCUTAneous, TID WC  insulin lispro (1 Unit Dial) 0-6 Units, 0-6 Units, SubCUTAneous, Nightly  glucose (GLUTOSE) 40 % oral gel 15 g, 15 g, Oral, PRN  dextrose 10 % infusion, 12.5 g, IntraVENous, PRN  glucagon (rDNA) injection 1 mg, 1 mg, IntraMUSCular, PRN  dextrose 5 % solution, 100 mL/hr, IntraVENous, PRN  vancomycin (VANCOCIN) 1,500 mg in dextrose 5 % 250 mL IVPB, 1,500 mg, IntraVENous, Q24H  cefepime (MAXIPIME) 2000 mg IVPB minibag, 2,000 mg, IntraVENous, Q12H  amLODIPine (NORVASC) tablet 5 mg, 5 mg, Oral, Daily **AND** lisinopril (PRINIVIL;ZESTRIL) tablet 10 mg, 10 mg, Oral, Daily         Objective:  /72   Pulse 87   Temp 97.9 °F (36.6 °C) (Oral)   Resp 16   Ht 5' 10\" (1.778 m) 6.4 04/14/2021     No results found for: CKTOTAL, CKMB, CKMBINDEX, TROPONINI    Recent Imaging Results are Reviewed:  XR HAND LEFT (MIN 3 VIEWS)    Result Date: 3/30/2022  EXAMINATION: THREE XRAY VIEWS OF THE LEFT HAND 3/30/2022 2:42 pm COMPARISON: None. HISTORY: ORDERING SYSTEM PROVIDED HISTORY: cellulitis - cat bite TECHNOLOGIST PROVIDED HISTORY: Reason for exam:->cellulitis - cat bite FINDINGS: There is no evidence of acute fracture. There is normal alignment. No acute joint abnormality. No focal osseous lesion. No focal soft tissue abnormality. No acute osseous abnormality. No foreign body identified     CT Lung Screen (Initial or Annual)    Result Date: 3/7/2022  EXAMINATION: LOW DOSE SCREENING CT OF THE CHEST WITHOUT CONTRAST 3/6/2022 12:38 pm TECHNIQUE: Low dose lung cancer screening CT of the chest was performed without the administration of intravenous contrast.  Multiplanar reformatted images are provided for review. Dose modulation, iterative reconstruction, and/or weight based adjustment of the mA/kV was utilized to reduce the radiation dose to as low as reasonably achievable. COMPARISON: None. HISTORY: ORDERING SYSTEM PROVIDED HISTORY: Personal history of tobacco use TECHNOLOGIST PROVIDED HISTORY: Age: 66 y.o. Smoking History: Social History Tobacco Use Smoking status: Current Every Day Smoker Packs/day: 1.00 Smokeless tobacco: Never Used Vaping Use Vaping Use: Not on file Alcohol use: Never Drug use: Never Pack years: 0 No previous lung cancer screening exam LDCT,CHEST Is there documentation of shared decision making? ->Yes Does the patient show any signs or symptoms of lung cancer? ->No Is this the first (baseline) CT or an annual exam?->Baseline Is this a low dose CT or a routine CT?->Low Dose CT Smoking Status? ->Former Smoker Date quit smoking? (must be within 15 years)->1/1/09 Smoking packs per day?->2 Years smoking?->30 CTDlvol (mGy)->1.18 DLP (mGy*cm)->45.03 Reconstructed image width (mm)->2 Reason for Exam: Personal history of tobacco use, Cigarette nicotine dependence in remission, Nicotine dependence, uncomplicated, unspecified nicotine product type FINDINGS: Mediastinum:  Atherosclerotic change seen in aorta. Moderate coronary artery calcification is seen. Aortic valve calcification is seen. No pericardial effusion. No pericardial calcification is seen. Small hiatal hernia seen. There is nonspecific thickening at the GE junction. There is a questionable nodule projecting inferiorly off the left lobe of thyroid measuring 2.4 cm Lungs/Pleura:  Mild underlying emphysema is seen. No large blebs or bulla. No obstructing endobronchial lesions are seen. Calcified granuloma seen in the right lower lobe. Small noncalcified pulmonary nodule seen in right lower lobe, measuring 4 mm. Subtle ground-glass nodule seen in right upper lobe, measuring 5 mm Upper Abdomen: There is mild thickening and hypodensity seen in the adrenal glands, measuring up to 2.0 cm in size on the left, likely adenoma. Moderate stool seen in the visualized colon. Soft Tissues/Bones: Spurring is seen in the spine. Spurring is seen in the shoulder joints. Sebaceous cyst is seen posteriorly in the chest wall on the right, measuring 2.4 cm     Mild emphysema with small ground-glass nodule and noncalcified pulmonary nodule. Calcified granuloma also seen. Moderate coronary artery calcification Questionable thyroid nodule on the left. Based on size, ultrasound could be considered for further characterization LUNG RADS: Per ACR Lung-RADS Version 1.1 Category 2, Benign appearance or behavior. Management:  Continue annual lung screening with LDCT in 12 months.  RECOMMENDATIONS: Unavailable       Lab Results   Component Value Date    GLUCOSE 100 03/31/2022     Lab Results   Component Value Date    POCGLU 146 03/31/2022     /72   Pulse 87   Temp 97.9 °F (36.6 °C) (Oral)   Resp 16   Ht 5' 10\" (1.778 m)   Wt 179 lb 14.3 oz (81.6 kg)   SpO2 94%   BMI 25.81 kg/m²     Assessment and Plan:  Principal Problem:    Cellulitis of finger of left hand - Established problem. Improving. Plan: cont empiric iv abx   Active Problems:    Cat bite -Established problem. Stable. Plan: ID asked to see for abx guidance. Poss I+d tomorrow per ortho    HTN (hypertension) =Established problem. Stable. 133/72  Plan: Continue present orders/plan. Depression -Established problem. Stable. Plan: Continue present orders/plan. DM2 (diabetes mellitus, type 2) ([facility] [address] -Established problem. Stable.   FSBS 146  Plan: cont ccc diet      (Please note that portions of this note were completed with a voice recognition program.  Efforts were made to edit the dictations but occasionally words are mis-transcribed.)        Soco Edwards MD  3/31/2022

## 2022-03-31 NOTE — CONSULTS
Infectious Diseases   Consult Note        Admission Date: 3/30/2022  Hospital Day: Hospital Day: 2   Attending: Yunier Leslie MD  Date of service: 3/31/22     Reason for admission: Cellulitis of left hand [L03.114]  Cellulitis of finger of left hand [L03.012]    Chief complaint/ Reason for consult: Cat bite, Left hand abscess    Microbiology:        I have reviewed allavailable micro lab data and cultures    · Blood culture (2/2) - collected on 3/30/2022: in process  · Left hand wound culture  - collected on 3/30/2022: In process  Culture, Wound  Order: [id number]   Status: Preliminary result     Visible to patient: No (not released)     Next appt: None    Specimen Information: Abscess         0 Result Notes    Component 3/30/22 1434 Resulting Agency   WOUND/ABSCESS No growth to date P  [facility]   Gram Stain Result No WBC's seen   1+ Gram positive cocci  [facility]             Narrative  Performed by: [facility]  ORDER#: [id number]                          ORDERED BY: Evaristo Florian   SOURCE: Abscess                            COLLECTED:  03/30/22 14:34   ANTIBIOTICS AT [facility]. :                      RECEIVED :  03/30/22 20:32      Specimen Collected: 03/30/22 14:34 Last Resulted: 03/31/22 12:57               Antibiotics and immunizations:       Current antibiotics: All antibiotics and their doses were reviewed by me    Recent Abx Admin                   linezolid (ZYVOX) tablet 600 mg (mg) 600 mg Given 03/31/22 2042    piperacillin-tazobactam (ZOSYN) 4,500 mg in dextrose 5 % 100 mL IVPB (mini-bag) (mg) 4,500 mg New Bag 03/31/22 1350    cefepime (MAXIPIME) 2000 mg IVPB minibag (mg) 2,000 mg New Bag 03/31/22 0451                  Immunization History: All immunization history was reviewed by me today. Immunization History   Administered Date(s) Administered    Tdap (Boostrix, Adacel) 03/31/2022       Known drug allergies:      All allergies were reviewed and updated    No Known Allergies    Social history:     Social History:  All social andepidemiologic history was reviewed and updated by me today as needed. · Tobacco use:   reports that he has been smoking. He has been smoking about 1.00 pack per day. He has never used smokeless tobacco.  · Alcohol use:   reports no history of alcohol use. · Currently lives in: [facility]  ·  reports no history of drug use. COVID VACCINATION AND LAB RESULT RECORDS:     Internal Administration   First Dose      Second Dose           Last COVID Lab SARS-CoV-2 (no units)   Date Value   01/22/2021 Not Detected            Assessment:     The patient is a 74 y.o. old male who  has a past medical history of Diabetes mellitus ([address] Hyperlipidemia, and Hypertension. with following problems:    · Left hand redness and swelling  · History of cat bite to the left hand  · Elevated sed rate of 55  · Reviewed his CRP of 52.2   · Essential hypertension  · Type 2 diabetes mellitus  · History of prostatectomy      Discussion:      The patient suffered a cat bite on his left hand on Sunday. He was started on Augmentin by his PCP. He unfortunately started having worsening redness and swelling and warmth of the left and extending to his left forearm and left elbow. Severe pain in the left forearm. I took a detailed history from the patient. The bite was unprovoked it was patient's domestic cat. The patient tells me that the cat is vaccinated.   He tells me that the cat has a dermatological condition and as patient was trying to help it, the cat did not like it admitted him at his left hand      Plan:     Diagnostic Workup:    · Agree with blood cultures  · Follow-up on left hand wound culture  · Continue to follow fever curve, WBC count and blood cultures  · Follow up on liverand renal functions closely  · Will order CT scan of the left hand with IV contrast    Antimicrobials:    · I will stop IV vancomycin and cefepime of Dr. Robbin Vasquez MD.    History was obtained from chart review and the patient. The patient was admitted on 3/30/2022. I have been consulted to see the patient for above mentioned reason(s). The patient has multiple medical comorbidities, and presented to the ER for increasing pain and swelling and redness of the left hand. The patient is right-hand dominant. The patient has had some purulent drainage from the cat bite wound for past 24 hours. In the ER, the patient was afebrile but her white cell count was elevated at 11,300. He was admitted. He was started empirically on vancomycin and cefepime    I have been asked for my opinion for management for this patient. Past Medical History: All past medical history reviewed today. Past Medical History:   Diagnosis Date    Diabetes mellitus ([facility] [address]     Hyperlipidemia     Hypertension          Past Surgical History: All pastsurgical history was reviewed today. Past Surgical History:   Procedure Laterality Date    PROSTATECTOMY N/A 1/28/2021    ROBOTIC LAPAROSCOPIC RADICAL PROSTATECTOMY WITH BILATERAL LYMPH NODE DISSECTION AND NON NERVE SPARE performed by Valdez Johnson MD at [address] [place] [address]         Family History: All family history was reviewed today. History reviewed. No pertinent family history. Medications: All current and past medications were reviewed.     Medications Prior to Admission: UNKNOWN TO PATIENT, Another diabetic pill  ondansetron (ZOFRAN) 4 MG tablet, Take 1 tablet by mouth 3 times daily as needed for Nausea or Vomiting  senna-docusate (PERICOLACE) 8.6-50 MG per tablet, Take 1 tablet by mouth 2 times daily  metFORMIN (GLUCOPHAGE) 1000 MG tablet, Take 1,000 mg by mouth 2 times daily (with meals)  glimepiride (AMARYL) 2 MG tablet, Take 2 mg by mouth 2 times daily  pioglitazone (ACTOS) 45 MG tablet, Take 45 mg by mouth daily  hydroCHLOROthiazide (MICROZIDE) 12.5 MG capsule, Take 12.5 mg by mouth daily  amLODIPine-benazepril (LOTREL) 5-10 MG per capsule, Take 1 capsule by mouth daily  PARoxetine (PAXIL) 20 MG tablet, Take 20 mg by mouth nightly  Rosuvastatin Calcium 10 MG CPSP, Take by mouth nightly     linezolid  600 mg Oral 2 times per day    lactobacillus  1 capsule Oral BID     piperacillin-tazobactam  3,375 mg IntraVENous Q8H    glimepiride  2 mg Oral BID     pioglitazone  45 mg Oral Daily    hydroCHLOROthiazide  12.5 mg Oral Daily    PARoxetine  20 mg Oral Nightly    rosuvastatin  10 mg Oral Nightly    sennosides-docusate sodium  1 tablet Oral BID    sodium chloride flush  5-40 mL IntraVENous 2 times per day    enoxaparin  40 mg SubCUTAneous Daily    insulin lispro  0-12 Units SubCUTAneous TID     insulin lispro  0-6 Units SubCUTAneous Nightly    amLODIPine  5 mg Oral Daily    And    lisinopril  10 mg Oral Daily          REVIEW OF SYSTEMS:       Review of Systems   Constitutional: Negative for chills, diaphoresis and fever. HENT: Negative for ear discharge, ear pain, rhinorrhea, sore throat and trouble swallowing. Eyes: Negative for discharge and redness. Respiratory: Negative for cough, shortness of breath and wheezing. Cardiovascular: Negative for chest pain and leg swelling. Gastrointestinal: Negative for abdominal pain, constipation, diarrhea and nausea. Endocrine: Negative for polyuria. Genitourinary: Negative for dysuria, flank pain, frequency, hematuria and urgency. Musculoskeletal: Positive for arthralgias and myalgias. Negative for back pain. Left hand redness and swelling and discharge   Skin: Negative for rash. Neurological: Negative for dizziness, seizures and headaches. Hematological: Does not bruise/bleed easily. Psychiatric/Behavioral: Negative for hallucinations and suicidal ideas. All other systems reviewed and are negative.         Objective:       PHYSICAL EXAM:      Vitals:   Vitals:    03/31/22 0409 03/31/22 0453 03/31/22 0830 03/31/22 2030   BP: 109/61  133/72 138/74   Pulse: 73  87 79   Resp: 18  16 16   Temp: 97.9 °F (36.6 °C)  97.9 °F (36.6 °C) 97.7 °F (36.5 °C)   TempSrc: Oral  Oral Oral   SpO2: 92%  94% 95%   Weight:  179 lb 14.3 oz (81.6 kg)     Height:           Physical Exam  Vitals and nursing note reviewed. Constitutional:       Appearance: Normal appearance. He is well-developed. HENT:      Head: Normocephalic and atraumatic. Right Ear: External ear normal.      Left Ear: External ear normal.      Nose: Nose normal. No congestion or rhinorrhea. Mouth/Throat:      Mouth: Mucous membranes are moist.      Pharynx: No oropharyngeal exudate or posterior oropharyngeal erythema. Eyes:      General: No scleral icterus. Right eye: No discharge. Left eye: No discharge. Conjunctiva/sclera: Conjunctivae normal.      Pupils: Pupils are equal, round, and reactive to light. Cardiovascular:      Rate and Rhythm: Normal rate and regular rhythm. Pulses: Normal pulses. Heart sounds: No murmur heard. No friction rub. Pulmonary:      Effort: Pulmonary effort is normal. No respiratory distress. Breath sounds: Normal breath sounds. No stridor. No wheezing, rhonchi or rales. Abdominal:      General: Bowel sounds are normal.      Palpations: Abdomen is soft. Tenderness: There is no abdominal tenderness. There is no right CVA tenderness, left CVA tenderness, guarding or rebound. Musculoskeletal:         General: Swelling and tenderness present. Normal range of motion. Cervical back: Normal range of motion and neck supple. No rigidity. No muscular tenderness. Lymphadenopathy:      Cervical: No cervical adenopathy. Skin:     General: Skin is warm and dry. Coloration: Skin is not jaundiced. Findings: Erythema present. No rash. Comments: Left hand with discharge. See picture below   Neurological:      General: No focal deficit present.       Mental Status: He is alert and oriented to person, place, and time. Mental status is at baseline. Motor: No abnormal muscle tone. Psychiatric:         Mood and Affect: Mood normal.         Behavior: Behavior normal.         Thought Content: Thought content normal.           Lines and drains: All vascular access sites are healthy with no local erythema, discharge or tenderness. Intake and output:     I/O last 3 completed shifts: In: 834.1 [I.V.:487.4; IV Piggyback:346.8]  Out: -     Lab Data:   All available labs were reviewed by me today. CBC:   Recent Labs     03/30/22  1434 03/31/22  0501   WBC 11.3* 8.3   RBC 5.38 4.95   HGB 15.5 14.2   HCT 47.1 43.3    292   MCV 87.5 87.5   MCH 28.9 28.6   MCHC 33.0 32.7   RDW 14.8 15.0        BMP:  Recent Labs     03/30/22  1434 03/31/22  0500    143   K 3.6 3.8    105   CO2 26 25   BUN 15 18   CREATININE 0.9 0.8   CALCIUM 9.8 9.1   GLUCOSE 145* 100*        Hepatic FunctionPanel:   Lab Results   Component Value Date    ALKPHOS 53 03/31/2022    ALT 7 03/31/2022    AST 11 03/31/2022    PROT 6.1 03/31/2022    PROT 7.5 06/19/2010    BILITOT 1.1 03/31/2022    BILIDIR <0.2 04/14/2021    IBILI see below 04/14/2021    LABALBU 3.9 03/31/2022       CPK: No results found for: CKTOTAL  ESR:   Lab Results   Component Value Date    SEDRATE 55 (H) 03/30/2022     CRP:   Lab Results   Component Value Date    CRP 52.2 (H) 03/30/2022         Imaging: All pertinent images and reports for the current visit were reviewed by me during this visit. I reviewed the chest x-ray/CT scan/MRI images and independently interpreted the findings and results today. XR HAND LEFT (MIN 3 VIEWS)   Final Result   No acute osseous abnormality. No foreign body identified         CT HAND LEFT W CONTRAST    (Results Pending)       Outside records:    Labs, Microbiology, Radiology and pertinent results from Care everywhere, if available, were reviewed as a part ofthe consultation.       Problem list:       Patient Active Problem List   Diagnosis Code    Prostate cancer ([facility] [address] C61    Cellulitis of left hand L03.114    Cat bite W55. 01XA    HTN (hypertension) I10    Depression F32. A    DM2 (diabetes mellitus, type 2) (HCC) E11.9    Elevated sed rate R70.0    Elevated C-reactive protein (CRP) R79.82    History of prostatectomy Z90.79    Need for diphtheria-tetanus-pertussis (Tdap) vaccine Z23         Please note that this chart was generated using Dragon dictation software. Although every effort was made to ensure the accuracy of this automated transcription, some errors in transcription may have occurred inadvertently. If you may need any clarification, please do not hesitate to contact me through EPIC or at the phone number provided below with my electronic signature. Any pictures or media included in this note were obtained after taking informed verbal consent from the patient and with their approval to include those in the patient's medical record.         Rai Castro MD, MPH, MEGAN Matamoros  3/31/2022, 9:47 PM  [facility] Infectious Disease   [address] [place] [address], [address] [place] [address]  Office: [phone]  Fax: [phone]  Clinic days:  Tuesday & Thursday

## 2022-03-31 NOTE — ANESTHESIA PRE PROCEDURE
Department of Anesthesiology  Preprocedure Note       Name:  Meghan Teresa   Age:  74 y.o.  :  1950                                          MRN:  [id number]         Date:  3/31/2022      Surgeon: Clara Smith):  Stone Bucio MD    Procedure: Procedure(s):  INCISION AND DRAINAGE LEFT HAND, INDICTED PROCEDURES    Medications prior to admission:   Prior to Admission medications    Medication Sig Start Date End Date Taking? Authorizing Provider   UNKNOWN TO PATIENT Another diabetic pill    Historical Provider, MD   ondansetron (ZOFRAN) 4 MG tablet Take 1 tablet by mouth 3 times daily as needed for Nausea or Vomiting 21   Niya Millan MD   senna-docusate (PERICOLACE) 8.6-50 MG per tablet Take 1 tablet by mouth 2 times daily 21   Niya Millan MD   metFORMIN (GLUCOPHAGE) 1000 MG tablet Take 1,000 mg by mouth 2 times daily (with meals)    Historical Provider, MD   glimepiride (AMARYL) 2 MG tablet Take 2 mg by mouth 2 times daily    Historical Provider, MD   pioglitazone (ACTOS) 45 MG tablet Take 45 mg by mouth daily    Historical Provider, MD   hydroCHLOROthiazide (MICROZIDE) 12.5 MG capsule Take 12.5 mg by mouth daily    Historical Provider, MD   amLODIPine-benazepril (LOTREL) 5-10 MG per capsule Take 1 capsule by mouth daily    Historical Provider, MD   PARoxetine (PAXIL) 20 MG tablet Take 20 mg by mouth nightly    Historical Provider, MD   Rosuvastatin Calcium 10 MG CPSP Take by mouth nightly    Historical Provider, MD       Current medications:    No current facility-administered medications for this visit. No current outpatient medications on file.      Facility-Administered Medications Ordered in Other Visits   Medication Dose Route Frequency Provider Last Rate Last Admin    glimepiride (AMARYL) tablet 2 mg  2 mg Oral BID  Omar Mendes MD        pioglitazone (ACTOS) tablet 45 mg  45 mg Oral Daily Omar Mendes MD        hydroCHLOROthiazide (HYDRODIURIL) tablet 12.5 mg 12.5 mg Oral Daily Christianne Biggs MD   12.5 mg at 03/31/22 0043    PARoxetine (PAXIL) tablet 20 mg  20 mg Oral Nightly Christianne Biggs MD        rosuvastatin (CRESTOR) tablet 10 mg  10 mg Oral Nightly Christianne Biggs MD   10 mg at 03/30/22 2202    sennosides-docusate sodium (SENOKOT-S) 8.6-50 MG tablet 1 tablet  1 tablet Oral BID Christianne Biggs MD   1 tablet at 03/31/22 0840    0.9 % sodium chloride infusion   IntraVENous Continuous Christianne Biggs MD   Paused at 03/31/22 0451    sodium chloride flush 0.9 % injection 5-40 mL  5-40 mL IntraVENous 2 times per day Christianne Biggs MD   10 mL at 03/30/22 2204    sodium chloride flush 0.9 % injection 10 mL  10 mL IntraVENous PRN Christianne Biggs MD        0.9 % sodium chloride infusion   IntraVENous PRN Christianne Biggs MD        potassium chloride (KLOR-CON M) extended release tablet 40 mEq  40 mEq Oral PRN Christianne Biggs MD        Or    potassium bicarb-citric acid (EFFER-K) effervescent tablet 40 mEq  40 mEq Oral PRN Christianne Biggs MD        Or    potassium chloride 10 mEq/100 mL IVPB (Peripheral Line)  10 mEq IntraVENous PRN Christianne Biggs MD        enoxaparin (LOVENOX) injection 40 mg  40 mg SubCUTAneous Daily Christianne Biggs MD   40 mg at 03/31/22 0840    ondansetron (ZOFRAN-ODT) disintegrating tablet 4 mg  4 mg Oral Q8H PRN Christianne Biggs MD        Or    ondansetron [facility]) injection 4 mg  4 mg IntraVENous Q6H PRN Christianne Biggs MD        magnesium hydroxide (MILK OF MAGNESIA) 400 MG/5ML suspension 30 mL  30 mL Oral Daily PRN Christianne Biggs MD        acetaminophen (TYLENOL) tablet 650 mg  650 mg Oral Q6H PRN Christianne Biggs MD        Or    acetaminophen (TYLENOL) suppository 650 mg  650 mg Rectal Q6H PRN Christianne Biggs MD        hydrALAZINE (APRESOLINE) injection 10 mg  10 mg IntraVENous Q6H PRN Christianne Biggs MD        0.9 % sodium chloride bolus  500 mL IntraVENous PRN Christianne Biggs MD        insulin lispro (1 Unit no vitals filed for this visit.                                            BP Readings from Last 3 Encounters:   03/31/22 133/72   01/28/21 (!) 146/63   01/28/21 101/60       NPO Status:                                                                                 BMI:   Wt Readings from Last 3 Encounters:   03/31/22 179 lb 14.3 oz (81.6 kg)   01/28/21 195 lb 7 oz (88.6 kg)     There is no height or weight on file to calculate BMI.    CBC:   Lab Results   Component Value Date    WBC 8.3 03/31/2022    RBC 4.95 03/31/2022    HGB 14.2 03/31/2022    HCT 43.3 03/31/2022    MCV 87.5 03/31/2022    RDW 15.0 03/31/2022     03/31/2022       CMP:   Lab Results   Component Value Date     03/31/2022    K 3.8 03/31/2022     03/31/2022    CO2 25 03/31/2022    BUN 18 03/31/2022    CREATININE 0.8 03/31/2022    GFRAA >60 03/31/2022    GFRAA >60 06/19/2010    AGRATIO 1.8 03/31/2022    LABGLOM >60 03/31/2022    GLUCOSE 100 03/31/2022    PROT 6.1 03/31/2022    PROT 7.5 06/19/2010    CALCIUM 9.1 03/31/2022    BILITOT 1.1 03/31/2022    ALKPHOS 53 03/31/2022    AST 11 03/31/2022    ALT 7 03/31/2022       POC Tests:   Recent Labs     03/31/22  0743   POCGLU 102*       Coags:   Lab Results   Component Value Date    PROTIME 12.7 01/22/2021    INR 1.09 01/22/2021    APTT 32.0 01/22/2021       HCG (If Applicable): No results found for: PREGTESTUR, PREGSERUM, HCG, HCGQUANT     ABGs: No results found for: PHART, PO2ART, BOR4YTD, SYT0QER, BEART, U1EZQSYD     Type & Screen (If Applicable):  No results found for: LABABO, LABRH    Drug/Infectious Status (If Applicable):  No results found for: HIV, HEPCAB    COVID-19 Screening (If Applicable):   Lab Results   Component Value Date    COVID19 Not Detected 01/22/2021         Anesthesia Evaluation  Patient summary reviewed no history of anesthetic complications:   Airway: Mallampati: II  TM distance: >3 FB   Neck ROM: full  Mouth opening: > = 3 FB Dental:    (+) upper dentures Pulmonary: breath sounds clear to auscultation  (+) current smoker                           Cardiovascular:  Exercise tolerance: good (>4 METS),   (+) hypertension:,     (-) dysrhythmias,  angina and  CHF      Rhythm: regular  Rate: normal                    Neuro/Psych:   (+) psychiatric history (used to get panic attacks): stable with treatment   (-) seizures, TIA and CVA           GI/Hepatic/Renal:        (-) GERD       Endo/Other:    (+) DiabetesType II DM, , .                  ROS comment: No personal history of GA. Gets nauseous with pain pills. No family history of problems with GA. Abdominal:             Vascular: Other Findings:               Anesthesia Plan      general     ASA 2     (Chart review)  Induction: intravenous. MIPS: Postoperative opioids intended, Prophylactic antiemetics administered and Postoperative trial extubation. Plan discussed with attending.                   GA Ulrich - CRNA   3/31/2022

## 2022-03-31 NOTE — PLAN OF CARE
Problem: Falls - Risk of:  Goal: Will remain free from falls  Description: Will remain free from falls  Outcome: Ongoing  Goal: Absence of physical injury  Description: Absence of physical injury  Outcome: Ongoing     Problem: Infection:  Goal: Will remain free from infection  Description: Will remain free from infection  3/31/2022 1102 by Kath Cosme RN  Outcome: Ongoing  3/31/2022 0310 by Kathy Monae RN  Outcome: Ongoing

## 2022-03-31 NOTE — PROGRESS NOTES
0830: Shift assessment completed, morning medications given per MAR. VSS, alert and oriented. Call light within reach. The care plan and education has been reviewed and mutually agreed upon with the patient.

## 2022-04-01 ENCOUNTER — ANESTHESIA (OUTPATIENT)
Dept: OPERATING ROOM | Age: 72
DRG: 506 | End: 2022-04-01
Payer: MEDICARE

## 2022-04-01 VITALS
SYSTOLIC BLOOD PRESSURE: 102 MMHG | TEMPERATURE: 97 F | RESPIRATION RATE: 12 BRPM | DIASTOLIC BLOOD PRESSURE: 54 MMHG | OXYGEN SATURATION: 99 %

## 2022-04-01 PROBLEM — M00.9 SEPTIC ARTHRITIS OF HAND, RIGHT (HCC): Status: ACTIVE | Noted: 2022-04-01

## 2022-04-01 LAB
ANION GAP SERPL CALCULATED.3IONS-SCNC: 11 MMOL/L (ref 3–16)
BASOPHILS ABSOLUTE: 0 K/UL (ref 0–0.2)
BASOPHILS RELATIVE PERCENT: 0.8 %
BUN BLDV-MCNC: 20 MG/DL (ref 7–20)
CALCIUM SERPL-MCNC: 8.9 MG/DL (ref 8.3–10.6)
CHLORIDE BLD-SCNC: 107 MMOL/L (ref 99–110)
CO2: 25 MMOL/L (ref 21–32)
CREAT SERPL-MCNC: 0.9 MG/DL (ref 0.8–1.3)
EOSINOPHILS ABSOLUTE: 0.2 K/UL (ref 0–0.6)
EOSINOPHILS RELATIVE PERCENT: 3.1 %
GFR AFRICAN AMERICAN: >60
GFR NON-AFRICAN AMERICAN: >60
GLUCOSE BLD-MCNC: 103 MG/DL (ref 70–99)
GLUCOSE BLD-MCNC: 112 MG/DL (ref 70–99)
GLUCOSE BLD-MCNC: 122 MG/DL (ref 70–99)
GLUCOSE BLD-MCNC: 160 MG/DL (ref 70–99)
GLUCOSE BLD-MCNC: 186 MG/DL (ref 70–99)
GLUCOSE BLD-MCNC: 96 MG/DL (ref 70–99)
HCT VFR BLD CALC: 43 % (ref 40.5–52.5)
HEMOGLOBIN: 14.5 G/DL (ref 13.5–17.5)
LYMPHOCYTES ABSOLUTE: 1.2 K/UL (ref 1–5.1)
LYMPHOCYTES RELATIVE PERCENT: 18.9 %
MCH RBC QN AUTO: 29.7 PG (ref 26–34)
MCHC RBC AUTO-ENTMCNC: 33.7 G/DL (ref 31–36)
MCV RBC AUTO: 88.2 FL (ref 80–100)
MONOCYTES ABSOLUTE: 0.6 K/UL (ref 0–1.3)
MONOCYTES RELATIVE PERCENT: 9.4 %
NEUTROPHILS ABSOLUTE: 4.4 K/UL (ref 1.7–7.7)
NEUTROPHILS RELATIVE PERCENT: 67.8 %
PDW BLD-RTO: 14.7 % (ref 12.4–15.4)
PERFORMED ON: ABNORMAL
PERFORMED ON: NORMAL
PLATELET # BLD: 249 K/UL (ref 135–450)
PMV BLD AUTO: 8 FL (ref 5–10.5)
POTASSIUM SERPL-SCNC: 3.5 MMOL/L (ref 3.5–5.1)
RBC # BLD: 4.88 M/UL (ref 4.2–5.9)
SODIUM BLD-SCNC: 143 MMOL/L (ref 136–145)
WBC # BLD: 6.6 K/UL (ref 4–11)

## 2022-04-01 PROCEDURE — 7100000001 HC PACU RECOVERY - ADDTL 15 MIN: Performed by: ORTHOPAEDIC SURGERY

## 2022-04-01 PROCEDURE — 2580000003 HC RX 258: Performed by: INTERNAL MEDICINE

## 2022-04-01 PROCEDURE — 85025 COMPLETE CBC W/AUTO DIFF WBC: CPT

## 2022-04-01 PROCEDURE — 6360000002 HC RX W HCPCS: Performed by: NURSE ANESTHETIST, CERTIFIED REGISTERED

## 2022-04-01 PROCEDURE — 0R9V0ZZ DRAINAGE OF LEFT METACARPOPHALANGEAL JOINT, OPEN APPROACH: ICD-10-PCS | Performed by: ORTHOPAEDIC SURGERY

## 2022-04-01 PROCEDURE — 2500000003 HC RX 250 WO HCPCS: Performed by: ORTHOPAEDIC SURGERY

## 2022-04-01 PROCEDURE — 7100000000 HC PACU RECOVERY - FIRST 15 MIN: Performed by: ORTHOPAEDIC SURGERY

## 2022-04-01 PROCEDURE — 6370000000 HC RX 637 (ALT 250 FOR IP): Performed by: NURSE PRACTITIONER

## 2022-04-01 PROCEDURE — 87205 SMEAR GRAM STAIN: CPT

## 2022-04-01 PROCEDURE — 87075 CULTR BACTERIA EXCEPT BLOOD: CPT

## 2022-04-01 PROCEDURE — 3600000013 HC SURGERY LEVEL 3 ADDTL 15MIN: Performed by: ORTHOPAEDIC SURGERY

## 2022-04-01 PROCEDURE — 2580000003 HC RX 258: Performed by: ORTHOPAEDIC SURGERY

## 2022-04-01 PROCEDURE — 87070 CULTURE OTHR SPECIMN AEROBIC: CPT

## 2022-04-01 PROCEDURE — 6360000002 HC RX W HCPCS: Performed by: NURSE PRACTITIONER

## 2022-04-01 PROCEDURE — 99233 SBSQ HOSP IP/OBS HIGH 50: CPT | Performed by: INTERNAL MEDICINE

## 2022-04-01 PROCEDURE — 36415 COLL VENOUS BLD VENIPUNCTURE: CPT

## 2022-04-01 PROCEDURE — 3600000012 HC SURGERY LEVEL 2 ADDTL 15MIN: Performed by: ORTHOPAEDIC SURGERY

## 2022-04-01 PROCEDURE — 87077 CULTURE AEROBIC IDENTIFY: CPT

## 2022-04-01 PROCEDURE — 6360000002 HC RX W HCPCS: Performed by: INTERNAL MEDICINE

## 2022-04-01 PROCEDURE — 3600000003 HC SURGERY LEVEL 3 BASE: Performed by: ORTHOPAEDIC SURGERY

## 2022-04-01 PROCEDURE — 26075 EXPLORE/TREAT FINGER JOINT: CPT | Performed by: ORTHOPAEDIC SURGERY

## 2022-04-01 PROCEDURE — 2709999900 HC NON-CHARGEABLE SUPPLY: Performed by: ORTHOPAEDIC SURGERY

## 2022-04-01 PROCEDURE — 99232 SBSQ HOSP IP/OBS MODERATE 35: CPT | Performed by: ORTHOPAEDIC SURGERY

## 2022-04-01 PROCEDURE — 2580000003 HC RX 258: Performed by: NURSE PRACTITIONER

## 2022-04-01 PROCEDURE — 80048 BASIC METABOLIC PNL TOTAL CA: CPT

## 2022-04-01 PROCEDURE — 3600000002 HC SURGERY LEVEL 2 BASE: Performed by: ORTHOPAEDIC SURGERY

## 2022-04-01 PROCEDURE — 1200000000 HC SEMI PRIVATE

## 2022-04-01 PROCEDURE — 3700000001 HC ADD 15 MINUTES (ANESTHESIA): Performed by: ORTHOPAEDIC SURGERY

## 2022-04-01 PROCEDURE — 3700000000 HC ANESTHESIA ATTENDED CARE: Performed by: ORTHOPAEDIC SURGERY

## 2022-04-01 RX ORDER — PROPOFOL 10 MG/ML
INJECTION, EMULSION INTRAVENOUS PRN
Status: DISCONTINUED | OUTPATIENT
Start: 2022-04-01 | End: 2022-04-01 | Stop reason: SDUPTHER

## 2022-04-01 RX ORDER — FENTANYL CITRATE 50 UG/ML
INJECTION, SOLUTION INTRAMUSCULAR; INTRAVENOUS PRN
Status: DISCONTINUED | OUTPATIENT
Start: 2022-04-01 | End: 2022-04-01 | Stop reason: SDUPTHER

## 2022-04-01 RX ORDER — BUPIVACAINE HYDROCHLORIDE 2.5 MG/ML
INJECTION, SOLUTION EPIDURAL; INFILTRATION; INTRACAUDAL
Status: COMPLETED | OUTPATIENT
Start: 2022-04-01 | End: 2022-04-01

## 2022-04-01 RX ORDER — SODIUM CHLORIDE 9 MG/ML
INJECTION, SOLUTION INTRAVENOUS CONTINUOUS
Status: DISCONTINUED | OUTPATIENT
Start: 2022-04-01 | End: 2022-04-02

## 2022-04-01 RX ORDER — DEXAMETHASONE SODIUM PHOSPHATE 4 MG/ML
INJECTION, SOLUTION INTRA-ARTICULAR; INTRALESIONAL; INTRAMUSCULAR; INTRAVENOUS; SOFT TISSUE PRN
Status: DISCONTINUED | OUTPATIENT
Start: 2022-04-01 | End: 2022-04-01 | Stop reason: SDUPTHER

## 2022-04-01 RX ORDER — TRAMADOL HYDROCHLORIDE 50 MG/1
50 TABLET ORAL EVERY 6 HOURS PRN
Status: DISCONTINUED | OUTPATIENT
Start: 2022-04-01 | End: 2022-04-02 | Stop reason: HOSPADM

## 2022-04-01 RX ORDER — ONDANSETRON 2 MG/ML
INJECTION INTRAMUSCULAR; INTRAVENOUS PRN
Status: DISCONTINUED | OUTPATIENT
Start: 2022-04-01 | End: 2022-04-01 | Stop reason: SDUPTHER

## 2022-04-01 RX ADMIN — GLIMEPIRIDE 2 MG: 2 TABLET ORAL at 18:35

## 2022-04-01 RX ADMIN — TRAMADOL HYDROCHLORIDE 50 MG: 50 TABLET, COATED ORAL at 15:17

## 2022-04-01 RX ADMIN — PIPERACILLIN AND TAZOBACTAM 3375 MG: 3; .375 INJECTION, POWDER, LYOPHILIZED, FOR SOLUTION INTRAVENOUS at 15:16

## 2022-04-01 RX ADMIN — PIPERACILLIN AND TAZOBACTAM 3375 MG: 3; .375 INJECTION, POWDER, LYOPHILIZED, FOR SOLUTION INTRAVENOUS at 05:53

## 2022-04-01 RX ADMIN — ONDANSETRON 4 MG: 2 INJECTION INTRAMUSCULAR; INTRAVENOUS at 12:28

## 2022-04-01 RX ADMIN — INSULIN LISPRO 1 UNITS: 100 INJECTION, SOLUTION INTRAVENOUS; SUBCUTANEOUS at 21:08

## 2022-04-01 RX ADMIN — DEXAMETHASONE SODIUM PHOSPHATE 4 MG: 4 INJECTION, SOLUTION INTRAMUSCULAR; INTRAVENOUS at 12:22

## 2022-04-01 RX ADMIN — PIPERACILLIN AND TAZOBACTAM 3375 MG: 3; .375 INJECTION, POWDER, LYOPHILIZED, FOR SOLUTION INTRAVENOUS at 21:17

## 2022-04-01 RX ADMIN — Medication 1 CAPSULE: at 18:35

## 2022-04-01 RX ADMIN — Medication 10 ML: at 10:04

## 2022-04-01 RX ADMIN — Medication 10 ML: at 21:07

## 2022-04-01 RX ADMIN — ROSUVASTATIN CALCIUM 10 MG: 10 TABLET, COATED ORAL at 21:06

## 2022-04-01 RX ADMIN — LINEZOLID 600 MG: 600 TABLET, FILM COATED ORAL at 21:06

## 2022-04-01 RX ADMIN — FENTANYL CITRATE 25 MCG: 50 INJECTION, SOLUTION INTRAMUSCULAR; INTRAVENOUS at 12:25

## 2022-04-01 RX ADMIN — PAROXETINE HYDROCHLORIDE 20 MG: 20 TABLET, FILM COATED ORAL at 21:06

## 2022-04-01 RX ADMIN — SODIUM CHLORIDE: 9 INJECTION, SOLUTION INTRAVENOUS at 12:11

## 2022-04-01 RX ADMIN — FENTANYL CITRATE 50 MCG: 50 INJECTION, SOLUTION INTRAMUSCULAR; INTRAVENOUS at 12:14

## 2022-04-01 RX ADMIN — INSULIN LISPRO 2 UNITS: 100 INJECTION, SOLUTION INTRAVENOUS; SUBCUTANEOUS at 18:35

## 2022-04-01 RX ADMIN — PROPOFOL 150 MG: 10 INJECTION, EMULSION INTRAVENOUS at 12:14

## 2022-04-01 RX ADMIN — FENTANYL CITRATE 25 MCG: 50 INJECTION, SOLUTION INTRAMUSCULAR; INTRAVENOUS at 12:35

## 2022-04-01 ASSESSMENT — ENCOUNTER SYMPTOMS
COUGH: 0
EYE REDNESS: 0
WHEEZING: 0
CONSTIPATION: 0
SHORTNESS OF BREATH: 0
NAUSEA: 0
EYE DISCHARGE: 0
RHINORRHEA: 0
ABDOMINAL PAIN: 0
DIARRHEA: 0
BACK PAIN: 0
SORE THROAT: 0
TROUBLE SWALLOWING: 0

## 2022-04-01 ASSESSMENT — PULMONARY FUNCTION TESTS
PIF_VALUE: 3
PIF_VALUE: 4
PIF_VALUE: 15
PIF_VALUE: 3
PIF_VALUE: 15
PIF_VALUE: 3
PIF_VALUE: 0
PIF_VALUE: 3
PIF_VALUE: 3
PIF_VALUE: 1
PIF_VALUE: 4
PIF_VALUE: 0
PIF_VALUE: 3
PIF_VALUE: 4
PIF_VALUE: 3
PIF_VALUE: 0
PIF_VALUE: 3
PIF_VALUE: 4
PIF_VALUE: 3
PIF_VALUE: 19
PIF_VALUE: 3
PIF_VALUE: 3
PIF_VALUE: 9
PIF_VALUE: 3
PIF_VALUE: 1
PIF_VALUE: 3
PIF_VALUE: 5
PIF_VALUE: 15
PIF_VALUE: 3
PIF_VALUE: 3
PIF_VALUE: 5
PIF_VALUE: 4

## 2022-04-01 ASSESSMENT — PAIN SCALES - GENERAL
PAINLEVEL_OUTOF10: 0
PAINLEVEL_OUTOF10: 4
PAINLEVEL_OUTOF10: 0

## 2022-04-01 NOTE — ANESTHESIA POSTPROCEDURE EVALUATION
Department of Anesthesiology  Postprocedure Note    Patient: Lincoln Mcnally  MRN: [id number]  YOB: 1950  Date of evaluation: 4/1/2022  Time:  1:09 PM     Procedure Summary     Date: 04/01/22 Room / Location: [address]    Anesthesia Start: 1211 Anesthesia Stop: 1300    Procedure: INCISION AND DRAINAGE LEFT HAND (Left ) Diagnosis: (M01.X42  LEFT HAND/FINGER INFECTION)    Surgeons: Philemon Felty, MD Responsible Provider: Marylou Monge MD    Anesthesia Type: General ASA Status: 3          Anesthesia Type: General    Avelino Phase I: Avelino Score: 10    Avelino Phase II:      Last vitals: Reviewed and per EMR flowsheets.        Anesthesia Post Evaluation    Patient location during evaluation: PACU  Patient participation: complete - patient participated  Level of consciousness: awake  Airway patency: patent  Nausea & Vomiting: no nausea and no vomiting  Complications: no  Cardiovascular status: blood pressure returned to baseline  Respiratory status: acceptable  Hydration status: stable

## 2022-04-01 NOTE — PROGRESS NOTES
Infectious Diseases   Progress Note      Admission Date: 3/30/2022  Hospital Day: Hospital Day: 3   Attending: Yari Pan MD  Date of service: 4/1/2022     Chief complaint/ Reason for consult:     · Left hand redness and swelling  · History of cat bite to the left hand  · Elevated sed rate of 55  · Reviewed his CRP of 52.2     Microbiology:        I have reviewed allavailable micro lab data and cultures    · Blood culture (2/2) - collected on 3/30/2022: Negative      Antibiotics and immunizations:       Current antibiotics: All antibiotics and their doses were reviewed by me    Recent Abx Admin                   piperacillin-tazobactam (ZOSYN) 3,375 mg in dextrose 5 % 50 mL IVPB (mini-bag) (mg) 3,375 mg New Bag 04/01/22 0553     3,375 mg New Bag 03/31/22 2238    linezolid (ZYVOX) tablet 600 mg (mg) 600 mg Given 03/31/22 2042    piperacillin-tazobactam (ZOSYN) 4,500 mg in dextrose 5 % 100 mL IVPB (mini-bag) (mg) 4,500 mg New Bag 03/31/22 1350                  Immunization History: All immunization history was reviewed by me today. Immunization History   Administered Date(s) Administered    Tdap (Boostrix, Adacel) 03/31/2022       Known drug allergies: All allergies were reviewed and updated    No Known Allergies    Social history:     Social History:  All social andepidemiologic history was reviewed and updated by me today as needed. · Tobacco use:   reports that he has been smoking. He has been smoking about 1.00 pack per day. He has never used smokeless tobacco.  · Alcohol use:   reports no history of alcohol use. · Currently lives in: [facility]  ·  reports no history of drug use.      COVID VACCINATION AND LAB RESULT RECORDS:     Internal Administration   First Dose      Second Dose           Last COVID Lab SARS-CoV-2 (no units)   Date Value   01/22/2021 Not Detected            Assessment:     The patient is a 74 y.o. old male who  has a past medical history of Diabetes mellitus ([address] Hyperlipidemia, and Hypertension. with following problems:      · Left hand redness and swelling - covered with linezolid and Zosyn   · History of cat bite to the left hand  · Elevated sed rate of 55-should improve slowly  · Reviewed his CRP of 52.2   · Essential hypertension  · Type 2 diabetes mellitus-maintain good glucose control  · History of prostatectomy  ·       Discussion:      The patient is afebrile. I had started him on IV linezolid and Zosyn yesterday. He is tolerating antibiotics okay. Left hand wound culture is in process. Gram stain shows gram-positive cocci. Orthopedics has donesurgical I&D of the left hand abscess    Serum creatinine 0.9. White cell count 6600      Plan:     Diagnostic Workup:    · Will f/u on surgical cultures  · Continue to follow  fever curve, WBC count and blood cultures. · Continue to monitor blood counts, liver and renal function. Antimicrobials:    · Will continue linezolid 600 milligrams every 12 hour  · Continue IV Zosyn 3.375 g every 8 hour  · Continue to monitor his vitals closely  · Left hand surgical I&D site care  · Continue oral probiotic twice daily  · We will follow up on the culture results and clinical progress and will make further recommendations accordingly. · Continue close vitals monitoring. · Maintain good glycemic control. · Fall precautions. Aspiration precautions. · Continue to watch for new fever or diarrhea. · DVT prophylaxis. · Discussed all above with patient and RN. Drug Monitoring:    · Continue monitoring for antibiotic toxicity as follows: CBC, CMP   · Continue to watch for following: new or worsening fever, new hypotension, hives, lip swelling and redness or purulence at vascular access sites. I/v access Management:    · Continue to monitor i.v access sites for erythema, induration, discharge or tenderness.    · As always, continue efforts to minimize tubes/lines/drains as clinically appropriate to reduce chances of line associated infections. Patient education and counseling:        · The patient was educated in detail about the side-effects of various antibiotics and things to watch for like new rashes, lip swelling, severe reaction, worsening diarrhea, break through fever etc.  · Discussed patient's condition and what to expect. All of the patient's questions were addressed in a satisfactory manner and patient verbalized understanding all instructions. Level of complexity of visit: High     Diabetes mellitus education and counseling:    Patient was educated in detail about the importance of keeping diabetes under control to improve the cure rate of infection and prevent future infections. Patient was advised to check blood glucose level regularly and to stay compliant with the diabetes medications. Patient was advised to the trim the toe nails carefully, wear shoes or slippers at all times and check both feet everyday before going to bed to look for any cuts, blisters, swelling or redness. Importance of washing the feet everyday with soap and water and keeping them dry, and seeking prompt medical attention in case of a non-healing wound or ulcer on the feet was also highlighted. TIME SPENT TODAY:     - Spent over  36 minutes on visit (including interval history, physical exam, review of data including labs, cultures, imaging, development and implementation of treatment plan and coordination of complex care). More than 50 percent of this includes face-to-face time spent with the patient for counseling and coordination of care. Thank you for involving me in the care of your patient. I will continue to follow. If you have anyadditional questions, please do not hesitate to contact me. Subjective: Interval history: Interval history was obtained from chart review and patient/ RN. The patient is afebrile. Has ongoing swelling of the left hand.   He is tolerating antibiotics okay     REVIEW OF SYSTEMS:      Review of Systems   Constitutional: Positive for fatigue. Negative for chills, diaphoresis and fever. HENT: Negative for ear discharge, ear pain, rhinorrhea, sore throat and trouble swallowing. Eyes: Negative for discharge and redness. Respiratory: Negative for cough, shortness of breath and wheezing. Cardiovascular: Negative for chest pain and leg swelling. Gastrointestinal: Negative for abdominal pain, constipation, diarrhea and nausea. Endocrine: Negative for polyuria. Genitourinary: Negative for dysuria, flank pain, frequency, hematuria and urgency. Musculoskeletal: Positive for arthralgias. Negative for back pain and myalgias. Post op pain in left hand   Skin: Negative for rash. Neurological: Negative for dizziness, seizures and headaches. Hematological: Does not bruise/bleed easily. Psychiatric/Behavioral: Negative for hallucinations and suicidal ideas. All other systems reviewed and are negative. Past Medical History: All past medical history reviewed today. Past Medical History:   Diagnosis Date    Diabetes mellitus ([facility] [address]     Hyperlipidemia     Hypertension        Past Surgical History: All past surgical history was reviewed today. Past Surgical History:   Procedure Laterality Date    PROSTATECTOMY N/A 1/28/2021    ROBOTIC LAPAROSCOPIC RADICAL PROSTATECTOMY WITH BILATERAL LYMPH NODE DISSECTION AND NON NERVE SPARE performed by Wagner Pickens MD at [facility]       Family History: All family history was reviewed today. History reviewed. No pertinent family history. Objective:       PHYSICAL EXAM:      Vitals:   Vitals:    04/01/22 0730 04/01/22 0739 04/01/22 1050 04/01/22 1109   BP: 125/74  116/85    Pulse: 69 61 76 76   Resp: 16  18    Temp: 97.7 °F (36.5 °C)  97.8 °F (36.6 °C)    TempSrc: Oral  Temporal    SpO2: 95%  97%    Weight:   183 lb (83 kg)    Height:   5' 10\" (1.778 m)        Physical Exam  Vitals and nursing note reviewed. Constitutional:       Appearance: Normal appearance. He is well-developed. HENT:      Head: Normocephalic and atraumatic. Right Ear: External ear normal.      Left Ear: External ear normal.      Nose: Nose normal. No congestion or rhinorrhea. Mouth/Throat:      Mouth: Mucous membranes are moist.      Pharynx: No oropharyngeal exudate or posterior oropharyngeal erythema. Eyes:      General: No scleral icterus. Right eye: No discharge. Left eye: No discharge. Conjunctiva/sclera: Conjunctivae normal.      Pupils: Pupils are equal, round, and reactive to light. Cardiovascular:      Rate and Rhythm: Normal rate and regular rhythm. Pulses: Normal pulses. Heart sounds: No murmur heard. No friction rub. Pulmonary:      Effort: Pulmonary effort is normal. No respiratory distress. Breath sounds: Normal breath sounds. No stridor. No wheezing, rhonchi or rales. Abdominal:      General: Bowel sounds are normal.      Palpations: Abdomen is soft. Tenderness: There is no abdominal tenderness. There is no right CVA tenderness, left CVA tenderness, guarding or rebound. Musculoskeletal:         General: Tenderness present. No swelling. Normal range of motion. Cervical back: Normal range of motion and neck supple. No rigidity. No muscular tenderness. Comments: Surgical dressing on left hand   Lymphadenopathy:      Cervical: No cervical adenopathy. Skin:     General: Skin is warm and dry. Coloration: Skin is not jaundiced. Findings: No erythema or rash. Neurological:      General: No focal deficit present. Mental Status: He is alert and oriented to person, place, and time. Mental status is at baseline. Motor: No abnormal muscle tone. Psychiatric:         Mood and Affect: Mood normal.         Behavior: Behavior normal.         Thought Content:  Thought content normal.         Lines and drains: All vascular access sites are healthy with no local erythema, discharge or tenderness. Intake and output:    I/O last 3 completed shifts: In: 1584.1 [P.O.:750; I.V.:487.4; IV Piggyback:346.8]  Out: -     Lab Data:   All available labs and old records have been reviewed by me. CBC:  Recent Labs     03/30/22  1434 03/31/22  0501 04/01/22  0501   WBC 11.3* 8.3 6.6   RBC 5.38 4.95 4.88   HGB 15.5 14.2 14.5   HCT 47.1 43.3 43.0    292 249   MCV 87.5 87.5 88.2   MCH 28.9 28.6 29.7   MCHC 33.0 32.7 33.7   RDW 14.8 15.0 14.7        BMP:  Recent Labs     03/30/22  1434 03/31/22  0500 04/01/22  0501    143 143   K 3.6 3.8 3.5    105 107   CO2 26 25 25   BUN 15 18 20   CREATININE 0.9 0.8 0.9   CALCIUM 9.8 9.1 8.9   GLUCOSE 145* 100* 122*        Hepatic Function Panel:   Lab Results   Component Value Date    ALKPHOS 53 03/31/2022    ALT 7 03/31/2022    AST 11 03/31/2022    PROT 6.1 03/31/2022    PROT 7.5 06/19/2010    BILITOT 1.1 03/31/2022    BILIDIR <0.2 04/14/2021    IBILI see below 04/14/2021    LABALBU 3.9 03/31/2022       CPK: No results found for: CKTOTAL  ESR:   Lab Results   Component Value Date    SEDRATE 55 (H) 03/30/2022     CRP:   Lab Results   Component Value Date    CRP 52.2 (H) 03/30/2022           Imaging: All pertinent images and reports for the current visit were reviewed by me during this visit. I reviewed the chest x-ray/CT scan/MRI images and independently interpreted the findings and results today. XR HAND LEFT (MIN 3 VIEWS)   Final Result   No acute osseous abnormality. No foreign body identified         CT HAND LEFT W CONTRAST    (Results Pending)       Medications: All current and past medications were reviewed.      linezolid  600 mg Oral 2 times per day    lactobacillus  1 capsule Oral BID WC    piperacillin-tazobactam  3,375 mg IntraVENous Q8H    glimepiride  2 mg Oral BID WC    pioglitazone  45 mg Oral Daily    hydroCHLOROthiazide  12.5 mg Oral Daily    PARoxetine  20 mg Oral Nightly    rosuvastatin  10 mg Oral Nightly    sennosides-docusate sodium  1 tablet Oral BID    sodium chloride flush  5-40 mL IntraVENous 2 times per day    enoxaparin  40 mg SubCUTAneous Daily    insulin lispro  0-12 Units SubCUTAneous TID WC    insulin lispro  0-6 Units SubCUTAneous Nightly    amLODIPine  5 mg Oral Daily    And    lisinopril  10 mg Oral Daily        sodium chloride      sodium chloride      dextrose      dextrose         sodium chloride flush, sodium chloride, potassium chloride **OR** potassium alternative oral replacement **OR** potassium chloride, ondansetron **OR** ondansetron, magnesium hydroxide, acetaminophen **OR** acetaminophen, hydrALAZINE, sodium chloride, glucose, dextrose, glucagon (rDNA), dextrose      Problem list:       Patient Active Problem List   Diagnosis Code    Prostate cancer ([facility] [address] C61    Cellulitis of left hand L03.114    Cat bite W55. 01XA    HTN (hypertension) I10    Depression F32. A    DM2 (diabetes mellitus, type 2) (HCC) E11.9    Elevated sed rate R70.0    Elevated C-reactive protein (CRP) R79.82    History of prostatectomy Z90.79    Need for diphtheria-tetanus-pertussis (Tdap) vaccine Z23    Septic arthritis of hand, left ([facility] [address] M00.9    Diabetes education, encounter for Z71.89       Please note that this chart was generated using Dragon dictation software. Although every effort was made to ensure the accuracy of this automated transcription, some errors in transcription may have occurred inadvertently. If you may need any clarification, please do not hesitate to contact me through EPIC or at the phone number provided below with my electronic signature. Any pictures or media included in this note were obtained after taking informed verbal consent from the patient and with their approval to include those in the patient's medical record.       Jena Chappell MD, MPH, [address]  4/1/2022, 12:10 PM  [facility] Infectious Disease   [address] [facility], [address] [facility], [address] [place] [address]  Office: [phone]  Fax: [phone]  Clinic days:  Tuesday & Thursday

## 2022-04-01 NOTE — PROGRESS NOTES
ORTHOPAEDIC PROGRESS NOTE    Chief Complaint   Patient presents with    Cellulitis     pt with cat bite on sunday now with cellulitis to right hand, been on antibiotics, sent by Dr. Carvajal Coma       HPI  4/1/22  Gus Cobb reports his left hand is somewhat improved since admission and with IV antibiotics  However he still has pain, redness  Pain with range of motion, pressure  He reports his cat bit him on Sunday (5 days ago)  It was his cat, not a stray cat  He denies numbness and tingling  No involvement elsewhere  No history of soft tissue/skin infections in the past  Denies fevers, no chills      Past Medical History:   Diagnosis Date    Diabetes mellitus ([facility] [address]     Hyperlipidemia     Hypertension        Past Surgical History:   Procedure Laterality Date    PROSTATECTOMY N/A 1/28/2021    ROBOTIC LAPAROSCOPIC RADICAL PROSTATECTOMY WITH BILATERAL LYMPH NODE DISSECTION AND NON NERVE SPARE performed by Fred Dave MD at [address] [place] [address]       No Known Allergies    Current Outpatient Medications   Medication Instructions    amLODIPine-benazepril (LOTREL) 5-10 MG per capsule 1 capsule, Oral, DAILY    glimepiride (AMARYL) 2 mg, Oral, 2 TIMES DAILY    hydroCHLOROthiazide (MICROZIDE) 12.5 mg, Oral, DAILY    metFORMIN (GLUCOPHAGE) 1,000 mg, Oral, 2 TIMES DAILY WITH MEALS    ondansetron (ZOFRAN) 4 mg, Oral, 3 TIMES DAILY PRN    PARoxetine (PAXIL) 20 mg, Oral, NIGHTLY    pioglitazone (ACTOS) 45 mg, Oral, DAILY    Rosuvastatin Calcium 10 MG CPSP Oral, NIGHTLY    senna-docusate (PERICOLACE) 8.6-50 MG per tablet 1 tablet, Oral, 2 TIMES DAILY    UNKNOWN TO PATIENT Another diabetic pill        Vitals:    04/01/22 0730 04/01/22 0739 04/01/22 1050 04/01/22 1109   BP: 125/74  116/85    Pulse: 69 61 76 76   Resp: 16  18    Temp: 97.7 °F (36.5 °C)  97.8 °F (36.6 °C)    TempSrc: Oral  Temporal    SpO2: 95%  97%    Weight:   183 lb (83 kg)    Height:   5' 10\" (1.778 m)        Physical Exam:  Body mass index is 26.26 kg/m².   NAD, pleasant  Normal mood and affect  CV RRR, left radial pulse intact  Resp even, unlabored, on RA  RUE SILT M/U/R/A nerve distributions; AIN/PIN/IO intact   Sensation intact to light touch entire index finger  Left hand - erythema and swelling localized to the right index finger metacarpophalangeal joint   Cat bite puncture wounds are seen, main one centered over the dorsal aspect of the index finger MP joint   EDC/EIP, FDP to index finger intact   Rest of the fingers and thumb WNL      Imaging:  Images were personally reviewed by myself and discussed with the patient  Narrative   EXAMINATION:   THREE XRAY VIEWS OF THE LEFT HAND       3/30/2022 2:42 pm       COMPARISON:   None.       HISTORY:   ORDERING SYSTEM PROVIDED HISTORY: cellulitis - cat bite   TECHNOLOGIST PROVIDED HISTORY:   Reason for exam:->cellulitis - cat bite       FINDINGS:   There is no evidence of acute fracture.  There is normal alignment.  No acute   joint abnormality.  No focal osseous lesion.  No focal soft tissue abnormality.           Impression   No acute osseous abnormality.       No foreign body identified           Labs:  Recent Labs     04/01/22  0501 03/31/22  0501 03/30/22  1434   WBC 6.6 8.3 11.3*   HGB 14.5 14.2 15.5   HCT 43.0 43.3 47.1   MCV 88.2 87.5 87.5    292 355     Lab Results   Component Value Date     04/01/2022    K 3.5 04/01/2022     04/01/2022    CO2 25 04/01/2022    BUN 20 04/01/2022    CREATININE 0.9 04/01/2022    GLUCOSE 122 (H) 04/01/2022    CALCIUM 8.9 04/01/2022    PROT 6.1 (L) 03/31/2022    LABALBU 3.9 03/31/2022    BILITOT 1.1 (H) 03/31/2022    ALKPHOS 53 03/31/2022    AST 11 (L) 03/31/2022    ALT 7 (L) 03/31/2022    LABGLOM >60 04/01/2022    GFRAA >60 04/01/2022    AGRATIO 1.8 03/31/2022    GLOB 2.9 01/22/2021     Lab Results   Component Value Date    INR 1.09 01/22/2021     Lab Results   Component Value Date    APTT 32.0 01/22/2021     Lab Results   Component Value Date    LABA1C 6.7 03/30/2022 LABA1C 6.4 04/14/2021    LABA1C 6.6 12/09/2019     No results found for: VITD25     Lab Results   Component Value Date    SEDRATE 55 (H) 03/30/2022     Lab Results   Component Value Date    CRP 52.2 (H) 03/30/2022         Assessment & Plan:  74 y.o. male following up for  Principal Problem:    Cellulitis of left hand  Active Problems:    Cat bite    HTN (hypertension)    Depression    DM2 (diabetes mellitus, type 2) (HCC)    Elevated sed rate    Elevated C-reactive protein (CRP)    History of prostatectomy    Need for diphtheria-tetanus-pertussis (Tdap) vaccine    Septic arthritis of hand, left (HCC)  Resolved Problems:    * No resolved hospital problems. *      I have recommended surgical I&D of the left index finger metacarpophalangeal joint to help manage this infection. There is no real role for non-operative treatment given current condition. Details of surgery and risks were discussed, including, but not limited to, need for further surgery, repeat I&D, persistent pain, stiffness, and neurovascular injury, especially in an infected and scarred wound bed where normal anatomy is distorted.   Because the infection involves a joint, there is potential for cartilage/bony destruction, arthritis, avascular necrosis, etc.  Patient is agreeable to proceed, informed consent obtained  Site marked  He is n.p.o. since midnight    Liana Eastman MD

## 2022-04-01 NOTE — PROGRESS NOTES
Ortho POC in PACU    Left hand dressing c/d/i  Sensation intact to light touch entire hand, including index finger  Moves all fingers up and down   FDP/EDC/EIP intact  Normal cap refill      Left index finger metacarpophalangeal joint septic arthritis secondary to cat bite  Antibiotic coverage for Pasteurella  Elevation above heart level to aid in pain and swelling  Start twice daily warm soapy soaks tonight  ROM exercises  OK for discharge over the weekend from my standpoint once Dr Rylie Moy and ID agrees    Follow-up with myself in the office 10 to 14 days    Dali Kathleen MD

## 2022-04-01 NOTE — PLAN OF CARE
[address] [facility] [address] Surgery  Plan of Care Note      Estevan Allen 74 y.o. seen again today. He continues with limited ROM of the left index finger MPJ. Mild pain but still having drainage. Given the location of the cat bite, concern for septic LEFT index MPJ. Has been NPO.     Plan:  - Proceed with left hand/index finger incision and drainage with indicated procedures with Dr. Roc Sweeney @ [facility].  - NPO   - Iinformed consent signed and in chart  - ID on board; appreciate their input (on zosyn and zyvox)  - Appreciate pre-op clearance from Dr. Jenna Domingo  - Expect IV abx overnight with possible transition to PO abx for d/c on Sat/GA Watkins - CNP 4/1/2022 8:14 AM

## 2022-04-01 NOTE — ANESTHESIA PRE PROCEDURE
Department of Anesthesiology  Preprocedure Note       Name:  Kvng Wesley   Age:  74 y.o.  :  1950                                          MRN:  [id number]         Date:  2022      Surgeon: Anjelica Caballero):  Davi Man MD    Procedure: Procedure(s):  INCISION AND DRAINAGE LEFT HAND, INDICTED PROCEDURES    Medications prior to admission:   Prior to Admission medications    Medication Sig Start Date End Date Taking?  Authorizing Provider   UNKNOWN TO PATIENT Another diabetic pill   Yes Historical Provider, MD   ondansetron (ZOFRAN) 4 MG tablet Take 1 tablet by mouth 3 times daily as needed for Nausea or Vomiting 21   Nora Rahman MD   senna-docusate (Pardo Phuong) 8.6-50 MG per tablet Take 1 tablet by mouth 2 times daily 21   Nora Rahman MD   metFORMIN (GLUCOPHAGE) 1000 MG tablet Take 1,000 mg by mouth 2 times daily (with meals)    Historical Provider, MD   glimepiride (AMARYL) 2 MG tablet Take 2 mg by mouth 2 times daily    Historical Provider, MD   pioglitazone (ACTOS) 45 MG tablet Take 45 mg by mouth daily    Historical Provider, MD   hydroCHLOROthiazide (MICROZIDE) 12.5 MG capsule Take 12.5 mg by mouth daily    Historical Provider, MD   amLODIPine-benazepril (LOTREL) 5-10 MG per capsule Take 1 capsule by mouth daily    Historical Provider, MD   PARoxetine (PAXIL) 20 MG tablet Take 20 mg by mouth nightly    Historical Provider, MD   Rosuvastatin Calcium 10 MG CPSP Take by mouth nightly    Historical Provider, MD       Current medications:    Current Facility-Administered Medications   Medication Dose Route Frequency Provider Last Rate Last Admin    0.9 % sodium chloride infusion   IntraVENous Continuous Davi Man MD        linezolid (ZYVOX) tablet 600 mg  600 mg Oral 2 times per day Sadner Frias MD   600 mg at 22    lactobacillus (CULTURELLE) capsule 1 capsule  1 capsule Oral BID  Daniel Bhatti MD   1 capsule at 22 1637    piperacillin-tazobactam (ZOSYN) 3,375 mg in dextrose 5 % 50 mL IVPB (mini-bag)  3,375 mg IntraVENous Q8H Daniel Bhatti MD   Stopped at 04/01/22 0953    glimepiride (AMARYL) tablet 2 mg  2 mg Oral BID WC Garth Steve MD        pioglitazone (ACTOS) tablet 45 mg  45 mg Oral Daily Garth Steve MD        hydroCHLOROthiazide (HYDRODIURIL) tablet 12.5 mg  12.5 mg Oral Daily Garth Steve MD   12.5 mg at 03/31/22 5211    PARoxetine (PAXIL) tablet 20 mg  20 mg Oral Nightly Garth Steve MD   20 mg at 03/31/22 2042    rosuvastatin (CRESTOR) tablet 10 mg  10 mg Oral Nightly Garth Steve MD   10 mg at 03/31/22 2042    sennosides-docusate sodium (SENOKOT-S) 8.6-50 MG tablet 1 tablet  1 tablet Oral BID Garth Steve MD   1 tablet at 03/31/22 2042    sodium chloride flush 0.9 % injection 5-40 mL  5-40 mL IntraVENous 2 times per day Garth Steve MD   10 mL at 04/01/22 1004    sodium chloride flush 0.9 % injection 10 mL  10 mL IntraVENous PRN Garth Steve MD        0.9 % sodium chloride infusion   IntraVENous PRN Garth Steve MD        potassium chloride (KLOR-CON M) extended release tablet 40 mEq  40 mEq Oral PRN Garth Steve MD        Or    potassium bicarb-citric acid (EFFER-K) effervescent tablet 40 mEq  40 mEq Oral PRN Garth Steve MD        Or    potassium chloride 10 mEq/100 mL IVPB (Peripheral Line)  10 mEq IntraVENous PRN Garth Steve MD        enoxaparin (LOVENOX) injection 40 mg  40 mg SubCUTAneous Daily Garth Steve MD   40 mg at 03/31/22 0840    ondansetron (ZOFRAN-ODT) disintegrating tablet 4 mg  4 mg Oral Q8H PRN Garth Steve MD        Or    ondansetron [facility]) injection 4 mg  4 mg IntraVENous Q6H PRN Garth Steve MD        magnesium hydroxide (MILK OF MAGNESIA) 400 MG/5ML suspension 30 mL  30 mL Oral Daily PRN Garth Steve MD        acetaminophen (TYLENOL) tablet 650 mg  650 mg Oral Q6H PRN Garth Steve MD        Or    acetaminophen (TYLENOL) suppository 650 mg  650 mg Rectal Q6H PRN Elijah Franco MD        hydrALAZINE (APRESOLINE) injection 10 mg  10 mg IntraVENous Q6H PRN Elijah Franco MD        0.9 % sodium chloride bolus  500 mL IntraVENous PRN Elijah Franco MD        insulin lispro (1 Unit Dial) 0-12 Units  0-12 Units SubCUTAneous TID WC Elijah Franco MD   2 Units at 03/31/22 1637    insulin lispro (1 Unit Dial) 0-6 Units  0-6 Units SubCUTAneous Nightly Elijah Franco MD   1 Units at 03/30/22 2225    glucose (GLUTOSE) 40 % oral gel 15 g  15 g Oral PRN Elijah Franco MD        dextrose 10 % infusion  12.5 g IntraVENous PRN Elijah Franco MD        glucagon (rDNA) injection 1 mg  1 mg IntraMUSCular PRN Elijah Franco MD        dextrose 5 % solution  100 mL/hr IntraVENous PRN Elijah Franco MD        amLODIPine (NORVASC) tablet 5 mg  5 mg Oral Daily Elijah Franco MD   5 mg at 03/30/22 2202    And    lisinopril (PRINIVIL;ZESTRIL) tablet 10 mg  10 mg Oral Daily Elijah Franco MD   10 mg at 03/30/22 2202       Allergies:  No Known Allergies    Problem List:    Patient Active Problem List   Diagnosis Code    Prostate cancer ([facility] [address] C61    Cellulitis of left hand L03.114    Cat bite W55. 01XA    HTN (hypertension) I10    Depression F32. A    DM2 (diabetes mellitus, type 2) (HCC) E11.9    Elevated sed rate R70.0    Elevated C-reactive protein (CRP) R79.82    History of prostatectomy Z90.79    Need for diphtheria-tetanus-pertussis (Tdap) vaccine Z23    Septic arthritis of hand, left (HCC) M00.9       Past Medical History:        Diagnosis Date    Diabetes mellitus ([facility] [address]     Hyperlipidemia     Hypertension        Past Surgical History:        Procedure Laterality Date    PROSTATECTOMY N/A 1/28/2021    ROBOTIC LAPAROSCOPIC RADICAL PROSTATECTOMY WITH BILATERAL LYMPH NODE DISSECTION AND NON NERVE SPARE performed by Hal Mullen MD at [address] [place] [address] History:    Social History     Tobacco Use    Smoking status: Current Every Day Smoker     Packs/day: 1.00    Smokeless tobacco: Never Used   Substance Use Topics    Alcohol use: Never                                Ready to quit: Not Answered  Counseling given: Not Answered      Vital Signs (Current):   Vitals:    04/01/22 0730 04/01/22 0739 04/01/22 1050 04/01/22 1109   BP: 125/74  116/85    Pulse: 69 61 76 76   Resp: 16  18    Temp: 97.7 °F (36.5 °C)  97.8 °F (36.6 °C)    TempSrc: Oral  Temporal    SpO2: 95%  97%    Weight:   183 lb (83 kg)    Height:   5' 10\" (1.778 m)                                               BP Readings from Last 3 Encounters:   04/01/22 116/85   01/28/21 (!) 146/63   01/28/21 101/60       NPO Status: Time of last liquid consumption: 0000                        Time of last solid consumption: 1630                        Date of last liquid consumption: 03/31/22                        Date of last solid food consumption: 03/31/22    BMI:   Wt Readings from Last 3 Encounters:   04/01/22 183 lb (83 kg)   01/28/21 195 lb 7 oz (88.6 kg)     Body mass index is 26.26 kg/m².     CBC:   Lab Results   Component Value Date    WBC 6.6 04/01/2022    RBC 4.88 04/01/2022    HGB 14.5 04/01/2022    HCT 43.0 04/01/2022    MCV 88.2 04/01/2022    RDW 14.7 04/01/2022     04/01/2022       CMP:   Lab Results   Component Value Date     04/01/2022    K 3.5 04/01/2022    K 3.8 03/31/2022     04/01/2022    CO2 25 04/01/2022    BUN 20 04/01/2022    CREATININE 0.9 04/01/2022    GFRAA >60 04/01/2022    GFRAA >60 06/19/2010    AGRATIO 1.8 03/31/2022    LABGLOM >60 04/01/2022    GLUCOSE 122 04/01/2022    PROT 6.1 03/31/2022    PROT 7.5 06/19/2010    CALCIUM 8.9 04/01/2022    BILITOT 1.1 03/31/2022    ALKPHOS 53 03/31/2022    AST 11 03/31/2022    ALT 7 03/31/2022       POC Tests:   Recent Labs     04/01/22  0704   POCGLU 112*       Coags:   Lab Results   Component Value Date    PROTIME 12.7 01/22/2021    INR 1.09 01/22/2021    APTT 32.0 01/22/2021       HCG (If Applicable): No results found for: PREGTESTUR, PREGSERUM, HCG, HCGQUANT     ABGs: No results found for: PHART, PO2ART, WMV4ZAD, MGK0EXC, BEART, S4CNGZHO     Type & Screen (If Applicable):  No results found for: LABABO, LABRH    Drug/Infectious Status (If Applicable):  No results found for: HIV, HEPCAB    COVID-19 Screening (If Applicable):   Lab Results   Component Value Date    COVID19 Not Detected 01/22/2021           Anesthesia Evaluation  Patient summary reviewed and Nursing notes reviewed  Airway: Mallampati: I  TM distance: >3 FB   Neck ROM: full  Mouth opening: > = 3 FB Dental: normal exam   (+) upper dentures and lower dentures      Pulmonary:Negative Pulmonary ROS and normal exam  breath sounds clear to auscultation                             Cardiovascular:  Exercise tolerance: good (>4 METS),   (+) hypertension:,       ECG reviewed  Rhythm: regular  Rate: normal                    Neuro/Psych:   (+) psychiatric history:            GI/Hepatic/Renal:             Endo/Other:    (+) Diabetes, . Abdominal:             Vascular: Other Findings:             Anesthesia Plan      general     ASA 3       Induction: intravenous. MIPS: Postoperative opioids intended and Prophylactic antiemetics administered. Anesthetic plan and risks discussed with patient. Plan discussed with CRNA.     Attending anesthesiologist reviewed and agrees with Balta Trujillo MD   4/1/2022

## 2022-04-01 NOTE — PROGRESS NOTES
PM assessment completed. Pt resting in bed, denies pain at this time. Pt reports redness to left hand improving. 15 minute warm water and Hibiclens soak started at this time. Will dress hand per order instructions when finished. No further needs voiced. Fall precautions in place, hourly rounding, call light and belongings in reach, bed in lowest position, wheels locked in place, side rails up x 2, walkways free of clutter.

## 2022-04-01 NOTE — BRIEF OP NOTE
Brief Postoperative Note      Patient: Oumar Harrington  YOB: 1950  MRN: [id number]    Date of Procedure: 4/1/2022    Pre-Op Diagnosis: M01. X42  LEFT HAND/FINGER INFECTION secondary to cat bite    Post-Op Diagnosis: Same       Procedure(s):  INCISION AND DRAINAGE LEFT HAND    Surgeon(s):  Maribel Mahajan MD    Assistant:  * No surgical staff found *    Anesthesia: General    Estimated Blood Loss (mL): 305GR    Complications: None    Specimens:   ID Type Source Tests Collected by Time Destination   1 : 1. LEFT HAND CULTURE - AEROBIC Tissue Tissue CULTURE, TISSUE Maribel Mahajan MD 4/1/2022 1230    2 : 2.  LEFT HAND CULTURE - ANAEROBIC CULTURE Tissue Tissue CULTURE, TISSUE Maribel Mahajan MD 4/1/2022 1236        Implants:  * No implants in log *      Drains:   [REMOVED] Urethral Catheter Non-latex;Straight-tip 18 fr (Removed)       Findings: left index finger MPJ septic arthritis    Electronically signed by Maribel Mahajan MD on 4/1/2022 at 12:57 PM

## 2022-04-01 NOTE — PROGRESS NOTES
Pt arrived from OR to PACU bay 8. Report received from OR staff. Pt arousable to voice. Surgical incisions dressings in place to L hand. Pt on 6L simple mask with opa removed on arrival, NSR, and VSS. Will continue to monitor.

## 2022-04-01 NOTE — PROGRESS NOTES
Pt stable and able to be transferred from PACU to room 4473. A&O , VSS, with no complaints at this time. 4T called and notified that pt is being transferred out of PACU and to room.

## 2022-04-01 NOTE — PROGRESS NOTES
Pt transferred to room 4473 at this time. A&O with no signs of distress. Report given to [facility] RN. V/u and denies questions or further needs at this time.

## 2022-04-01 NOTE — PROGRESS NOTES
Progress Note - Dr. Santino Mauricio - Internal Medicine  PCP: Poli Koch MD [address]  / [facility] ([place]) [phone]    Hospital Day: 2  Code Status: Full Code  Current Diet: Diet NPO        CC: follow up on medical issues    Subjective:   Bruce Crawford is a 74 y.o. male. Pt seen and examined  Chart reviewed since last visit, labs and imaging below        Doing ok  Pt seems to be doing better  More flexion in hand than yest  Case d/w zeus alvarado NP - plan for poss I+D today based on exam  Appreciate ID eval for abx recs  WBC now normal, 6.3    He denies chest pain, denies shortness of breath, denies nausea,  denies emesis. 10 system Review of Systems is reviewed with patient, and pertinent positives are noted in HPI above . Otherwise, Review of systems is negative. I have reviewed the patient's medical and social history in detail and updated the computerized patient record. To recap: He  has a past medical history of Diabetes mellitus ([facility] [address] Hyperlipidemia, and Hypertension. . He  has a past surgical history that includes Prostatectomy (N/A, 1/28/2021). Keyana Aguilar He  reports that he has been smoking. He has been smoking about 1.00 pack per day. He has never used smokeless tobacco. He reports that he does not drink alcohol and does not use drugs. .        Active Hospital Problems    Diagnosis Date Noted    Elevated sed rate [R70.0]     Elevated C-reactive protein (CRP) [R79.82]     History of prostatectomy [Z90.79]     Need for diphtheria-tetanus-pertussis (Tdap) vaccine [Z23]     Cellulitis of left hand [R85.469] 03/30/2022    Cat bite [W55.01XA] 03/30/2022    HTN (hypertension) [I10] 03/30/2022    Depression [F32. A] 03/30/2022    DM2 (diabetes mellitus, type 2) ([facility] [address] [E11.9] 03/30/2022       Current Facility-Administered Medications: linezolid (ZYVOX) tablet 600 mg, 600 mg, Oral, 2 times per day  lactobacillus (CULTURELLE) capsule 1 capsule, 1 capsule, Oral, BID WC  [COMPLETED] piperacillin-tazobactam (ZOSYN) 4,500 mg in dextrose 5 % 100 mL IVPB (mini-bag), 4,500 mg, IntraVENous, Once **FOLLOWED BY** piperacillin-tazobactam (ZOSYN) 3,375 mg in dextrose 5 % 50 mL IVPB (mini-bag), 3,375 mg, IntraVENous, Q8H  glimepiride (AMARYL) tablet 2 mg, 2 mg, Oral, BID WC  pioglitazone (ACTOS) tablet 45 mg, 45 mg, Oral, Daily  hydroCHLOROthiazide (HYDRODIURIL) tablet 12.5 mg, 12.5 mg, Oral, Daily  PARoxetine (PAXIL) tablet 20 mg, 20 mg, Oral, Nightly  rosuvastatin (CRESTOR) tablet 10 mg, 10 mg, Oral, Nightly  sennosides-docusate sodium (SENOKOT-S) 8.6-50 MG tablet 1 tablet, 1 tablet, Oral, BID  sodium chloride flush 0.9 % injection 5-40 mL, 5-40 mL, IntraVENous, 2 times per day  sodium chloride flush 0.9 % injection 10 mL, 10 mL, IntraVENous, PRN  0.9 % sodium chloride infusion, , IntraVENous, PRN  potassium chloride (KLOR-CON M) extended release tablet 40 mEq, 40 mEq, Oral, PRN **OR** potassium bicarb-citric acid (EFFER-K) effervescent tablet 40 mEq, 40 mEq, Oral, PRN **OR** potassium chloride 10 mEq/100 mL IVPB (Peripheral Line), 10 mEq, IntraVENous, PRN  enoxaparin (LOVENOX) injection 40 mg, 40 mg, SubCUTAneous, Daily  ondansetron (ZOFRAN-ODT) disintegrating tablet 4 mg, 4 mg, Oral, Q8H PRN **OR** ondansetron (ZOFRAN) injection 4 mg, 4 mg, IntraVENous, Q6H PRN  magnesium hydroxide (MILK OF MAGNESIA) 400 MG/5ML suspension 30 mL, 30 mL, Oral, Daily PRN  acetaminophen (TYLENOL) tablet 650 mg, 650 mg, Oral, Q6H PRN **OR** acetaminophen (TYLENOL) suppository 650 mg, 650 mg, Rectal, Q6H PRN  hydrALAZINE (APRESOLINE) injection 10 mg, 10 mg, IntraVENous, Q6H PRN  0.9 % sodium chloride bolus, 500 mL, IntraVENous, PRN  insulin lispro (1 Unit Dial) 0-12 Units, 0-12 Units, SubCUTAneous, TID WC  insulin lispro (1 Unit Dial) 0-6 Units, 0-6 Units, SubCUTAneous, Nightly  glucose (GLUTOSE) 40 % oral gel 15 g, 15 g, Oral, PRN  dextrose 10 % infusion, 12.5 g, IntraVENous, PRN  glucagon (rDNA) injection 1 mg, 1 mg, CTDlvol (mGy)->1.18 DLP (mGy*cm)->45.03 Reconstructed image width (mm)->2 Reason for Exam: Personal history of tobacco use, Cigarette nicotine dependence in remission, Nicotine dependence, uncomplicated, unspecified nicotine product type FINDINGS: Mediastinum:  Atherosclerotic change seen in aorta. Moderate coronary artery calcification is seen. Aortic valve calcification is seen. No pericardial effusion. No pericardial calcification is seen. Small hiatal hernia seen. There is nonspecific thickening at the GE junction. There is a questionable nodule projecting inferiorly off the left lobe of thyroid measuring 2.4 cm Lungs/Pleura:  Mild underlying emphysema is seen. No large blebs or bulla. No obstructing endobronchial lesions are seen. Calcified granuloma seen in the right lower lobe. Small noncalcified pulmonary nodule seen in right lower lobe, measuring 4 mm. Subtle ground-glass nodule seen in right upper lobe, measuring 5 mm Upper Abdomen: There is mild thickening and hypodensity seen in the adrenal glands, measuring up to 2.0 cm in size on the left, likely adenoma. Moderate stool seen in the visualized colon. Soft Tissues/Bones: Spurring is seen in the spine. Spurring is seen in the shoulder joints. Sebaceous cyst is seen posteriorly in the chest wall on the right, measuring 2.4 cm     Mild emphysema with small ground-glass nodule and noncalcified pulmonary nodule. Calcified granuloma also seen. Moderate coronary artery calcification Questionable thyroid nodule on the left. Based on size, ultrasound could be considered for further characterization LUNG RADS: Per ACR Lung-RADS Version 1.1 Category 2, Benign appearance or behavior. Management:  Continue annual lung screening with LDCT in 12 months.  RECOMMENDATIONS: Unavailable       Lab Results   Component Value Date    GLUCOSE 122 04/01/2022     Lab Results   Component Value Date    POCGLU 112 04/01/2022     /74   Pulse 61   Temp 97.7 °F (36.5 °C) (Oral)   Resp 16   Ht 5' 10\" (1.778 m)   Wt 179 lb 14.3 oz (81.6 kg)   SpO2 95%   BMI 25.81 kg/m²     Assessment and Plan:  Principal Problem:    Cellulitis of finger of left hand - Established problem. Improving. WBC 6.3 now  Plan: cont empiric iv abx per ID recs  Active Problems:    Cat bite -Established problem. Stable. Plan: ID asked to see for abx guidance. Poss I+d today per ortho    HTN (hypertension) =Established problem. Stable. 125/74  Plan: Continue present orders/plan. Depression -Established problem. Stable. Plan: Continue present orders/plan. DM2 (diabetes mellitus, type 2) ([address] -Established problem. Stable.   FSBS 112  Plan: cont ccc diet      Dr Mo Boards to cover this weekend    (Please note that portions of this note were completed with a voice recognition program.  Efforts were made to edit the dictations but occasionally words are mis-transcribed.)        Sandrea Schaumann, MD  4/1/2022

## 2022-04-01 NOTE — PLAN OF CARE
Problem: Falls - Risk of:  Goal: Will remain free from falls  Description: Will remain free from falls  Outcome: Ongoing  Goal: Absence of physical injury  Description: Absence of physical injury  Outcome: Ongoing     Problem: Infection:  Goal: Will remain free from infection  Description: Will remain free from infection  Outcome: Ongoing

## 2022-04-02 VITALS
HEIGHT: 70 IN | DIASTOLIC BLOOD PRESSURE: 61 MMHG | HEART RATE: 62 BPM | BODY MASS INDEX: 26.2 KG/M2 | RESPIRATION RATE: 16 BRPM | OXYGEN SATURATION: 97 % | SYSTOLIC BLOOD PRESSURE: 116 MMHG | WEIGHT: 183 LBS | TEMPERATURE: 97.7 F

## 2022-04-02 LAB
ANION GAP SERPL CALCULATED.3IONS-SCNC: 12 MMOL/L (ref 3–16)
BASOPHILS ABSOLUTE: 0 K/UL (ref 0–0.2)
BASOPHILS RELATIVE PERCENT: 0.5 %
BUN BLDV-MCNC: 19 MG/DL (ref 7–20)
CALCIUM SERPL-MCNC: 8.6 MG/DL (ref 8.3–10.6)
CHLORIDE BLD-SCNC: 108 MMOL/L (ref 99–110)
CO2: 23 MMOL/L (ref 21–32)
CREAT SERPL-MCNC: 0.7 MG/DL (ref 0.8–1.3)
EOSINOPHILS ABSOLUTE: 0.1 K/UL (ref 0–0.6)
EOSINOPHILS RELATIVE PERCENT: 0.7 %
GFR AFRICAN AMERICAN: >60
GFR NON-AFRICAN AMERICAN: >60
GLUCOSE BLD-MCNC: 100 MG/DL (ref 70–99)
GLUCOSE BLD-MCNC: 115 MG/DL (ref 70–99)
GLUCOSE BLD-MCNC: 163 MG/DL (ref 70–99)
GLUCOSE BLD-MCNC: 81 MG/DL (ref 70–99)
GRAM STAIN RESULT: NORMAL
HCT VFR BLD CALC: 41.8 % (ref 40.5–52.5)
HEMOGLOBIN: 13.8 G/DL (ref 13.5–17.5)
LYMPHOCYTES ABSOLUTE: 1.1 K/UL (ref 1–5.1)
LYMPHOCYTES RELATIVE PERCENT: 13.3 %
MCH RBC QN AUTO: 28.8 PG (ref 26–34)
MCHC RBC AUTO-ENTMCNC: 33.1 G/DL (ref 31–36)
MCV RBC AUTO: 87.1 FL (ref 80–100)
MONOCYTES ABSOLUTE: 0.6 K/UL (ref 0–1.3)
MONOCYTES RELATIVE PERCENT: 7.4 %
NEUTROPHILS ABSOLUTE: 6.2 K/UL (ref 1.7–7.7)
NEUTROPHILS RELATIVE PERCENT: 78.1 %
PDW BLD-RTO: 14.2 % (ref 12.4–15.4)
PERFORMED ON: ABNORMAL
PERFORMED ON: ABNORMAL
PERFORMED ON: NORMAL
PLATELET # BLD: 297 K/UL (ref 135–450)
PMV BLD AUTO: 7.6 FL (ref 5–10.5)
POTASSIUM SERPL-SCNC: 3.6 MMOL/L (ref 3.5–5.1)
RBC # BLD: 4.79 M/UL (ref 4.2–5.9)
SODIUM BLD-SCNC: 143 MMOL/L (ref 136–145)
WBC # BLD: 8 K/UL (ref 4–11)
WOUND/ABSCESS: NORMAL

## 2022-04-02 PROCEDURE — 6370000000 HC RX 637 (ALT 250 FOR IP): Performed by: NURSE PRACTITIONER

## 2022-04-02 PROCEDURE — 85025 COMPLETE CBC W/AUTO DIFF WBC: CPT

## 2022-04-02 PROCEDURE — 2580000003 HC RX 258: Performed by: NURSE PRACTITIONER

## 2022-04-02 PROCEDURE — 6360000002 HC RX W HCPCS: Performed by: NURSE PRACTITIONER

## 2022-04-02 PROCEDURE — 80048 BASIC METABOLIC PNL TOTAL CA: CPT

## 2022-04-02 PROCEDURE — 99024 POSTOP FOLLOW-UP VISIT: CPT | Performed by: ORTHOPAEDIC SURGERY

## 2022-04-02 RX ORDER — DOXYCYCLINE HYCLATE 100 MG
100 TABLET ORAL EVERY 12 HOURS SCHEDULED
Qty: 14 TABLET | Refills: 0 | Status: SHIPPED | OUTPATIENT
Start: 2022-04-02 | End: 2022-04-09

## 2022-04-02 RX ORDER — DOXYCYCLINE HYCLATE 100 MG
100 TABLET ORAL EVERY 12 HOURS SCHEDULED
Status: DISCONTINUED | OUTPATIENT
Start: 2022-04-02 | End: 2022-04-02 | Stop reason: HOSPADM

## 2022-04-02 RX ORDER — LACTOBACILLUS RHAMNOSUS GG 10B CELL
1 CAPSULE ORAL 2 TIMES DAILY WITH MEALS
COMMUNITY
Start: 2022-04-02 | End: 2022-11-02

## 2022-04-02 RX ADMIN — LINEZOLID 600 MG: 600 TABLET, FILM COATED ORAL at 09:56

## 2022-04-02 RX ADMIN — PIPERACILLIN AND TAZOBACTAM 3375 MG: 3; .375 INJECTION, POWDER, LYOPHILIZED, FOR SOLUTION INTRAVENOUS at 15:21

## 2022-04-02 RX ADMIN — AMLODIPINE BESYLATE 5 MG: 5 TABLET ORAL at 09:55

## 2022-04-02 RX ADMIN — SENNOSIDES AND DOCUSATE SODIUM 1 TABLET: 50; 8.6 TABLET ORAL at 09:55

## 2022-04-02 RX ADMIN — HYDROCHLOROTHIAZIDE 12.5 MG: 25 TABLET ORAL at 09:55

## 2022-04-02 RX ADMIN — GLIMEPIRIDE 2 MG: 2 TABLET ORAL at 09:56

## 2022-04-02 RX ADMIN — PIPERACILLIN AND TAZOBACTAM 3375 MG: 3; .375 INJECTION, POWDER, LYOPHILIZED, FOR SOLUTION INTRAVENOUS at 06:36

## 2022-04-02 RX ADMIN — Medication 1 CAPSULE: at 09:56

## 2022-04-02 RX ADMIN — INSULIN LISPRO 2 UNITS: 100 INJECTION, SOLUTION INTRAVENOUS; SUBCUTANEOUS at 12:02

## 2022-04-02 RX ADMIN — ENOXAPARIN SODIUM 40 MG: 40 INJECTION SUBCUTANEOUS at 09:55

## 2022-04-02 RX ADMIN — LISINOPRIL 10 MG: 10 TABLET ORAL at 09:56

## 2022-04-02 RX ADMIN — PIOGLITAZONE 45 MG: 15 TABLET ORAL at 09:56

## 2022-04-02 NOTE — OP NOTE
Final Anesthesia Post-op Assessment    Patient: Isela Reeves  Procedure(s) Performed: COLONOSCOPY - WITH MAC SEDATION  Anesthesia type: Monitor Anesthesia Care    Vital Last Value   Temperature 36.7 °C (98.1 °F) (03/27/17 0629)   Pulse 82 (03/27/17 0817)   Respiratory Rate 16 (03/27/17 0817)   Non-Invasive   Blood Pressure (!) 156/91 (03/27/17 0817)   Arterial  Blood Pressure     Pulse Oximetry 95 % (03/27/17 0817)     Last 24 I/O: No intake or output data in the 24 hours ending 03/27/17 0819    PATIENT LOCATION: PACU Phase 2  POST-OP VITAL SIGNS: stable  LEVEL OF CONSCIOUSNESS: participates in exam, awake, oriented, alert and answers questions appropriately  RESPIRATORY STATUS: spontaneous ventilation and room air  HYDRATION: euvolemic    PAIN MANAGEMENT: well controlled  NAUSEA: None  AIRWAY PATENCY: patent  POST-OP ASSESSMENT: no complications, patient tolerated procedure well with no complications and sufficiently recovered from acute administration of anesthesia effects and able to participate in evaluation  COMPLICATIONS: none  HANDOFF:  Handoff to receiving nurse was performed and questions were answered       the dorsal aspect of  the index finger metacarpophalangeal joint and thick skin flaps were  elevated. There was chronic grayish degenerated tissue over the index  finger extensor mechanism. This was sharply debrided with scalpel as  well as the freer elevator. The interval between the extensor indicis  proprius and extensor digitorum communis was identified and a small  capsulotomy was made directly over the dorsal capsule. There was  expression of some cloudy fluid from the joint and this was swabbed for  cultures. The tendon itself appeared somewhat attenuated, but there was  no obvious disruption seen and its integrity was overall maintained. The joint was thoroughly irrigated with 1 liter of normal saline and at  the conclusion of the irrigation, no further cloudy fluid remained. The  small capsulotomy was left open to promote drainage. The tourniquet was  let down and hemostasis was obtained with electrocautery and direct  pressure. The proximal skin was infiltrated with plain Marcaine. The  incision was loosely reapproximated with 4-0 nylon in a horizontal  mattress configuration to allow drainage. It was then dressed in the  usual fashion. The drapes were removed. The patient was transferred   back onto the hospital bed where he was extubated and then moved to the PACU.       Merari Randhawa MD    D: 04/01/2022 15:10:00       T: 04/02/2022 2:04:51     [email or web address]  Job#: [id number]     Doc#: [id number]    CC:

## 2022-04-02 NOTE — PROGRESS NOTES
Gave d/c instructions with list of active meds and when they are next due. Reviewed discharge instructions for animal bite wound care at bedside and provided printed copy of same. Reviewed dressing change procedure in detail and provided supplies. Pt verbalized understanding of all instructions. Denied additional questions. To home as passenger. Waiting for his spouse to arrive.

## 2022-04-02 NOTE — PROGRESS NOTES
Pt resting awake in bed. Has ambulated himself in the halls today. States he can manage dressing changes and soaks at home if discharged. Dr Angela Watkins in and wrote d/c order. Page placed to ID to make sure no further input required. Awaiting response.

## 2022-04-02 NOTE — PROGRESS NOTES
Pt resting in bed. Set up for Hibiclens bath and is currently bathing hand in basin. Denies need for pain medication. Hand is still reddened and swollen with some drainage noted on dressing but pt states its appearance is improved today. Spouse at bedside. Pt able to ambulate independently in room. Pt is axox4 and able to answer questions and follow commands throughout assessment.

## 2022-04-02 NOTE — PROGRESS NOTES
Patient Active Problem List   Diagnosis    Prostate cancer ([facility]    Cellulitis of left hand    Cat bite    HTN (hypertension)    Depression    DM2 (diabetes mellitus, type 2) ([facility]    Elevated sed rate    Elevated C-reactive protein (CRP)    History of prostatectomy    Need for diphtheria-tetanus-pertussis (Tdap) vaccine    Septic arthritis of hand, left ([facility]    Diabetes education, encounter for     Patient seen , discharge dictated scripts given , arrangements made , REESE completed .  Discussed with nursing staff  And   If applicable ,  Discussed with  Patient's family , all questions answered and concerns addressed  When applicable

## 2022-04-02 NOTE — PROGRESS NOTES
ORTHOPAEDIC PROGRESS NOTE    Chief Complaint   Patient presents with    Cellulitis     pt with cat bite on sunday now with cellulitis to right hand, been on antibiotics, sent by Dr. Serg Valencia       HPI  Patient reports hand feeling better  Improved ROM  No N/T  Did soaks      Current Facility-Administered Medications:     0.9 % sodium chloride infusion, , IntraVENous, Continuous, GA Fountain CNP, New Bag at 04/01/22 1211    enoxaparin (LOVENOX) injection 40 mg, 40 mg, SubCUTAneous, Daily, GA Fountain CNP, 40 mg at 04/02/22 0955    traMADol (ULTRAM) tablet 50 mg, 50 mg, Oral, Q6H PRN, GA Fountain CNP, 50 mg at 04/01/22 1517    linezolid (ZYVOX) tablet 600 mg, 600 mg, Oral, 2 times per day, GA Fountain CNP, 600 mg at 04/02/22 0956    lactobacillus (CULTURELLE) capsule 1 capsule, 1 capsule, Oral, BID WC, GA Fountain CNP, 1 capsule at 04/02/22 0956    [COMPLETED] piperacillin-tazobactam (ZOSYN) 4,500 mg in dextrose 5 % 100 mL IVPB (mini-bag), 4,500 mg, IntraVENous, Once, Stopped at 03/31/22 1420 **FOLLOWED BY** piperacillin-tazobactam (ZOSYN) 3,375 mg in dextrose 5 % 50 mL IVPB (mini-bag), 3,375 mg, IntraVENous, Q8H, GA Fountain CNP, Stopped at 04/02/22 1107    glimepiride (AMARYL) tablet 2 mg, 2 mg, Oral, BID WC, GA Fountain CNP, 2 mg at 04/02/22 0956    pioglitazone (ACTOS) tablet 45 mg, 45 mg, Oral, Daily, GA Fountain CNP, 45 mg at 04/02/22 0956    hydroCHLOROthiazide (HYDRODIURIL) tablet 12.5 mg, 12.5 mg, Oral, Daily, GA Fountain CNP, 12.5 mg at 04/02/22 0955    PARoxetine (PAXIL) tablet 20 mg, 20 mg, Oral, Nightly, GA Fountain CNP, 20 mg at 04/01/22 2106    rosuvastatin (CRESTOR) tablet 10 mg, 10 mg, Oral, Nightly, GA Fountain CNP, 10 mg at 04/01/22 2106    sennosides-docusate sodium (SENOKOT-S) 8.6-50 MG tablet 1 tablet, 1 tablet, Oral, BID, GA Fountain CNP, 1 tablet at 04/02/22 0955    sodium chloride flush 0.9 % injection 5-40 mL, 5-40 mL, IntraVENous, 2 times per day, Melany Hammer, APRN - CNP, 10 mL at 04/01/22 2107    sodium chloride flush 0.9 % injection 10 mL, 10 mL, IntraVENous, PRN, Melany Hammer, APRN - CNP    0.9 % sodium chloride infusion, , IntraVENous, PRN, Melany Hammer, APRN - CNP    potassium chloride (KLOR-CON M) extended release tablet 40 mEq, 40 mEq, Oral, PRN **OR** potassium bicarb-citric acid (EFFER-K) effervescent tablet 40 mEq, 40 mEq, Oral, PRN **OR** potassium chloride 10 mEq/100 mL IVPB (Peripheral Line), 10 mEq, IntraVENous, PRN, Melany Hammer, APRN - CNP    ondansetron (ZOFRAN-ODT) disintegrating tablet 4 mg, 4 mg, Oral, Q8H PRN **OR** ondansetron (ZOFRAN) injection 4 mg, 4 mg, IntraVENous, Q6H PRN, Melany Hammer, APRN - CNP    magnesium hydroxide (MILK OF MAGNESIA) 400 MG/5ML suspension 30 mL, 30 mL, Oral, Daily PRN, Melany Hammer, APRN - CNP    acetaminophen (TYLENOL) tablet 650 mg, 650 mg, Oral, Q6H PRN **OR** acetaminophen (TYLENOL) suppository 650 mg, 650 mg, Rectal, Q6H PRN, Melany Hammer, APRN - CNP    hydrALAZINE (APRESOLINE) injection 10 mg, 10 mg, IntraVENous, Q6H PRN, Melany Hammer, APRN - CNP    0.9 % sodium chloride bolus, 500 mL, IntraVENous, PRN, Melany Hammer, APRN - CNP    insulin lispro (1 Unit Dial) 0-12 Units, 0-12 Units, SubCUTAneous, TID WC, Melany Hammer, APRN - CNP, 2 Units at 04/02/22 1202    insulin lispro (1 Unit Dial) 0-6 Units, 0-6 Units, SubCUTAneous, Nightly, Melany Hammer, APRN - CNP, 1 Units at 04/01/22 2108    glucose (GLUTOSE) 40 % oral gel 15 g, 15 g, Oral, PRN, Melany Hammer, APRN - CNP    dextrose 10 % infusion, 12.5 g, IntraVENous, PRN, Melany Hammer, APRN - CNP    glucagon (rDNA) injection 1 mg, 1 mg, IntraMUSCular, PRN, Melayn Hammer, APRN - CNP    dextrose 5 % solution, 100 mL/hr, IntraVENous, PRN, Melany Hammer, APRN - CNP    amLODIPine (NORVASC) tablet 5 mg, 5 LABA1C 6.6 12/09/2019     No results found for: VITD25       4/2/2022  9:26 AM - [facility] Incoming Lab Results From Soft (Epic Adt)    Specimen Information: Hand        Component   Culture Surgical   No growth to date       Assessment & Plan:  74 y.o. male following up for  Principal Problem:    Cellulitis of left hand  Active Problems:    Cat bite    HTN (hypertension)    Depression    DM2 (diabetes mellitus, type 2) (HCC)    Elevated sed rate    Elevated C-reactive protein (CRP)    History of prostatectomy    Need for diphtheria-tetanus-pertussis (Tdap) vaccine    Septic arthritis of hand, left ([address]    Diabetes education, encounter for  Resolved Problems:    * No resolved hospital problems.  *      Antibiotic coverage for Pasteurella, per ID  Elevation above heart level to aid in pain and swelling  Continue twice daily warm soapy soaks   ROM exercises  OK for discharge over the weekend from my standpoint once Dr Yen Galvez and ID agrees     Follow-up with myself in the office 10 to 14 days     Rosendo Chi MD

## 2022-04-02 NOTE — PROGRESS NOTES
Spoke with Dr Marcy JARAMILLO and Dr Estella Preciado. Pt ok to d/c with doxycycline, pt to f/u with Dr Luis Eduardo Carty Monday and if any cause for concern ID can be consulted as outpt. Discussed with pt who verbalized understanding.

## 2022-04-03 LAB
BLOOD CULTURE, ROUTINE: NORMAL
BLOOD CULTURE, ROUTINE: NORMAL
CULTURE, BLOOD 2: NORMAL

## 2022-04-03 NOTE — DISCHARGE SUMMARY
[facility]                     [address] [facility], [address] [facility]                               DISCHARGE SUMMARY    PATIENT NAME: Cata Carreon                     :        1950  MED REC NO:   [id number]                          ROOM:       1190  ACCOUNT NO:   [de-identified]                           ADMIT DATE: 2022  PROVIDER:     Vandana Mccabe MD                  DISCHARGE DATE:  2022    FINAL DIAGNOSES:  1. Cat scratch disease. 2.  Cellulitis. 3.  Small abscess in the left hand. 4.  Hypertension. 5.  Type 2 diabetes mellitus. 6.  Depression. DISCHARGE MEDICATIONS:  1. Doxycycline 100 mg p.o. b.i.d. for 14 dosage. 2.  Culturelle tablet one tablet twice a day with meals. 3.  Tramadol 50 every six hours p.r.n.  4.  Glimepiride 2 mg twice a day. 5.  Actos 45 mg once a day. 6.  Microzide 12.5 mg daily. 7.  Paxil 20 mg once a day. 8.  Crestor 10 mg nightly. 9.  Senokot-S two tablets daily. HOSPITAL COURSE:  This 74year-old patient of Dr. Juan R Lopez was admitted  by him with cellulitis of the right hand with some serous oozing, was  bitten by a cat. The patient had purulent drainage. The patient was  treated with IV antibiotics, I and D by Dr. Susan Norman, orthopedic  surgeon. The patient initially appeared ill and somewhat septic with  white blood cell count 11.3, hemoglobin and hematocrit of 15.5 and 47.1,  and platelet count 272. Coagulation profile was normal.  The blood  culture and wound cultures were negative. The patient underwent  surgical treatment for incision and drainage, and debridement. X-ray of  the left hand did not reveal any osteomyelitis, no foreign body. After  three days of treatment, general condition improved significantly. The  patient was discharged in stable condition on oral antibiotics. His  cultures were negative. The patient will be following up with Dr. Rosario Moore as an outpatient.   Dr. Susan Norman did

## 2022-04-04 ENCOUNTER — TELEPHONE (OUTPATIENT)
Dept: ORTHOPEDIC SURGERY | Age: 72
End: 2022-04-04

## 2022-04-04 NOTE — TELEPHONE ENCOUNTER
Other PATIENT CALLED STATES THAT THE SOLUTION RAJINDER-HEX4  THAT DR Nathen King INFORMED PATIENT TO SOAK HIS HAND IN IS $10 A DOSE. HE IS WONDERING IF THERE IS ANYTHING  CHEAPER HE CAN PURCHASE.  PLS CALL TO ADVISE [phone]

## 2022-04-06 LAB
ANAEROBIC CULTURE: ABNORMAL
CULTURE SURGICAL: ABNORMAL
ORGANISM: ABNORMAL

## 2022-04-07 ENCOUNTER — TELEPHONE (OUTPATIENT)
Dept: INFECTIOUS DISEASES | Age: 72
End: 2022-04-07

## 2022-04-07 NOTE — TELEPHONE ENCOUNTER
----- Message from Priya Childs MD sent at 4/7/2022  2:44 PM EDT -----  Please see my result note below

## 2022-04-11 ENCOUNTER — OFFICE VISIT (OUTPATIENT)
Dept: ORTHOPEDIC SURGERY | Age: 72
End: 2022-04-11

## 2022-04-11 VITALS — BODY MASS INDEX: 26.05 KG/M2 | WEIGHT: 182 LBS | RESPIRATION RATE: 16 BRPM | HEIGHT: 70 IN

## 2022-04-11 DIAGNOSIS — M00.242: Primary | ICD-10-CM

## 2022-04-11 PROCEDURE — 99024 POSTOP FOLLOW-UP VISIT: CPT | Performed by: ORTHOPAEDIC SURGERY

## 2022-04-11 RX ORDER — AMOXICILLIN 500 MG/1
500 CAPSULE ORAL EVERY 8 HOURS
Qty: 42 CAPSULE | Refills: 0 | Status: SHIPPED | OUTPATIENT
Start: 2022-04-11 | End: 2022-04-25

## 2022-04-11 NOTE — TELEPHONE ENCOUNTER
Spoke with pt pt states \"everything is going well, I have an appt this afternoon with Dr. Xiao Ann. No more swelling. \"    Thanks.

## 2022-04-11 NOTE — PROGRESS NOTES
ORTHOPAEDIC PROGRESS NOTE    Chief Complaint   Patient presents with    Post-Op Check     left hand I&D - DOS 4.2.2022       HPI  4/11/22  Postop check left hand  He is at home, he is doing wound care as directed  He finished his doxycycline over the weekend  He reports improvement in his pain and swelling as well as range of motion  He reports the drainage has slowed/stopped  Pain is rated 1 out of 10  Denies numbness or tingling  Overall improved    4/1/2022  PREOPERATIVE DIAGNOSES:  1. Cat bite to left hand. 2.  Left index finger metacarpophalangeal joint septic arthritis. POSTOPERATIVE DIAGNOSES:  1. Cat bite to left hand. 2.  Left index finger metacarpophalangeal joint septic arthritis. OPERATION PERFORMED:  Left index finger metacarpophalangeal joint arthrotomy and drainage of infection. Past Medical History:   Diagnosis Date    Diabetes mellitus ([address]     Hyperlipidemia     Hypertension        Past Surgical History:   Procedure Laterality Date    BRAIN SURGERY Right 08/18/2021    had a tumor removed behind the right eye.      HAND SURGERY Left 4/1/2022    INCISION AND DRAINAGE LEFT HAND performed by Kellie Matthews MD at [address] [place] [address] N/A 1/28/2021    ROBOTIC LAPAROSCOPIC RADICAL PROSTATECTOMY WITH BILATERAL LYMPH NODE DISSECTION AND NON NERVE SPARE performed by Wade Mccormick MD at [address] [facility]       No Known Allergies    Current Outpatient Medications   Medication Instructions    amLODIPine-benazepril (LOTREL) 5-10 MG per capsule 1 capsule, Oral, DAILY    amoxicillin (AMOXIL) 500 mg, Oral, EVERY 8 HOURS    glimepiride (AMARYL) 2 mg, Oral, 2 TIMES DAILY    hydroCHLOROthiazide (MICROZIDE) 12.5 mg, Oral, DAILY    lactobacillus (CULTURELLE) capsule 1 capsule, Oral, 2 TIMES DAILY WITH MEALS    metFORMIN (GLUCOPHAGE) 1,000 mg, Oral, 2 TIMES DAILY WITH MEALS    ondansetron (ZOFRAN) 4 mg, Oral, 3 TIMES DAILY PRN    PARoxetine (PAXIL) 20 mg, Oral, NIGHTLY    pioglitazone (ACTOS) 45 mg, Oral, DAILY    Rosuvastatin Calcium 10 MG CPSP Oral, NIGHTLY    senna-docusate (PERICOLACE) 8.6-50 MG per tablet 1 tablet, Oral, 2 TIMES DAILY       Vitals:    04/11/22 1351   Resp: 16   Weight: 182 lb (82.6 kg)   Height: 5' 10\" (1.778 m)       Physical Exam:  Body mass index is 26.11 kg/m². Left hand - erythema persists over the dorsal aspect of the index finger metacarpophalangeal joint   Improved erythema and swelling compared to initial evaluation   However, erythema still present   There is no active drainage or purulence seen today   Puncture site and surgical incision is healing appropriately   Essentially full index finger active extension/flexion at the MPJ/PIPJ/DIPJ   Sensation intact to light touch entire index finger/hand      Imaging:  None today      Labs:  Component 4/1/22 1236   Anaerobic Culture No anaerobes isolated    Organism Streptococcus viridans group Abnormal     Culture Surgical Rare growth   No further workup       Nevaeh Farr MD   4/7/2022  2:44 PM EDT Back to Top        Please see my result note below    Nevaeh Farr MD   4/7/2022  2:44 PM EDT         Please call the patient and check how his hand is doing. Ivette Campoverde was discharged on doxycycline by hospitalist. Cristina Holstein the hand swelling is not improved, start him on oral amoxicillin 500 mg every 8 hours for 2 weeks.  Thanks. Assessment & Plan:  74 y.o. male following up for   Diagnosis Orders   1. Streptococcal septic arthritis of left index finger MPJ (HCC)         No orders of the defined types were placed in this encounter.       Clinically improved  Essentially full ROM  Still with erythema however  Amoxicillin 2-week prescription given today per Dr. Francisco Remedies recommendations    Continue with elevation, range of motion exercises  Continue daily wound care    FU with any issues    Nancy Diaz MD

## 2022-04-25 ENCOUNTER — TELEPHONE (OUTPATIENT)
Dept: CASE MANAGEMENT | Age: 72
End: 2022-04-25

## 2022-04-28 ENCOUNTER — TELEPHONE (OUTPATIENT)
Dept: ORTHOPEDIC SURGERY | Age: 72
End: 2022-04-28

## 2022-04-28 NOTE — TELEPHONE ENCOUNTER
Per Dr. Nena Weiss as long as he is doing well there is nothing more from our stand point. He stated that his pcp Dr. Acacia Gutierrez looked at it and said that everything looks well.

## 2022-04-28 NOTE — TELEPHONE ENCOUNTER
General Question     Subject: PATIENT STATES HE HAS COMPLETED HIS ANTIBIOTICS PLEASE ADVISE  Patient Roseypaxtonlance Germain  Contact Number: [phone]

## 2022-06-20 ENCOUNTER — TELEPHONE (OUTPATIENT)
Dept: ENDOCRINOLOGY | Age: 72
End: 2022-06-20

## 2022-11-02 ENCOUNTER — OFFICE VISIT (OUTPATIENT)
Dept: ENDOCRINOLOGY | Age: 72
End: 2022-11-02
Payer: MEDICARE

## 2022-11-02 VITALS
SYSTOLIC BLOOD PRESSURE: 125 MMHG | RESPIRATION RATE: 16 BRPM | HEIGHT: 70 IN | BODY MASS INDEX: 26.48 KG/M2 | DIASTOLIC BLOOD PRESSURE: 75 MMHG | HEART RATE: 86 BPM | WEIGHT: 185 LBS

## 2022-11-02 DIAGNOSIS — D32.9 MENINGIOMA (HCC): ICD-10-CM

## 2022-11-02 DIAGNOSIS — I10 ESSENTIAL HYPERTENSION: ICD-10-CM

## 2022-11-02 DIAGNOSIS — E78.2 MIXED HYPERLIPIDEMIA: ICD-10-CM

## 2022-11-02 DIAGNOSIS — E11.9 TYPE 2 DIABETES MELLITUS WITHOUT COMPLICATION, WITHOUT LONG-TERM CURRENT USE OF INSULIN (HCC): Primary | ICD-10-CM

## 2022-11-02 DIAGNOSIS — C61 PROSTATE CANCER (HCC): ICD-10-CM

## 2022-11-02 LAB — HBA1C MFR BLD: 6.1 %

## 2022-11-02 PROCEDURE — G8417 CALC BMI ABV UP PARAM F/U: HCPCS | Performed by: INTERNAL MEDICINE

## 2022-11-02 PROCEDURE — 4004F PT TOBACCO SCREEN RCVD TLK: CPT | Performed by: INTERNAL MEDICINE

## 2022-11-02 PROCEDURE — G8427 DOCREV CUR MEDS BY ELIG CLIN: HCPCS | Performed by: INTERNAL MEDICINE

## 2022-11-02 PROCEDURE — 2022F DILAT RTA XM EVC RTNOPTHY: CPT | Performed by: INTERNAL MEDICINE

## 2022-11-02 PROCEDURE — G8484 FLU IMMUNIZE NO ADMIN: HCPCS | Performed by: INTERNAL MEDICINE

## 2022-11-02 PROCEDURE — 3078F DIAST BP <80 MM HG: CPT | Performed by: INTERNAL MEDICINE

## 2022-11-02 PROCEDURE — 3074F SYST BP LT 130 MM HG: CPT | Performed by: INTERNAL MEDICINE

## 2022-11-02 PROCEDURE — 3044F HG A1C LEVEL LT 7.0%: CPT | Performed by: INTERNAL MEDICINE

## 2022-11-02 PROCEDURE — 83036 HEMOGLOBIN GLYCOSYLATED A1C: CPT | Performed by: INTERNAL MEDICINE

## 2022-11-02 PROCEDURE — 3017F COLORECTAL CA SCREEN DOC REV: CPT | Performed by: INTERNAL MEDICINE

## 2022-11-02 PROCEDURE — 1123F ACP DISCUSS/DSCN MKR DOCD: CPT | Performed by: INTERNAL MEDICINE

## 2022-11-02 PROCEDURE — 99204 OFFICE O/P NEW MOD 45 MIN: CPT | Performed by: INTERNAL MEDICINE

## 2022-11-02 RX ORDER — EMPAGLIFLOZIN, METFORMIN HYDROCHLORIDE 25; 1000 MG/1; MG/1
TABLET, EXTENDED RELEASE ORAL
Qty: 42 TABLET | Refills: 0 | COMMUNITY
Start: 2022-11-02 | End: 2022-11-14 | Stop reason: SDUPTHER

## 2022-11-02 RX ORDER — METFORMIN HYDROCHLORIDE 500 MG/1
500 TABLET, EXTENDED RELEASE ORAL
Qty: 30 TABLET | Refills: 1 | Status: SHIPPED | OUTPATIENT
Start: 2022-11-02

## 2022-11-02 RX ORDER — EMPAGLIFLOZIN 25 MG/1
TABLET, FILM COATED ORAL
COMMUNITY
Start: 2022-10-29

## 2022-11-02 NOTE — PROGRESS NOTES
Leo Abraham is a 74 y.o. male is referred to me by  for evaluation and management of Type diabetes. Leo Abraham was diagnosed with Diabetes mellitus at age 59 . He on Actos , jardiance and Amaryl . Metformin caused diarrhea   Diabetes was diagnosed at routine screening . Leo Abraham got diabetic education in the past.  Comorbid conditions:  neuropath  Having some hypoglycemia at night. Pt has hyperlipidemia and is on statins   He has been treated for rt sided meningioma on 8/2021and underwent craniotomy and resection and Radiation he follows with neurosurgery. He has hx of prostate cancer     INTERIM:    Diabetes  He presents for his initial diabetic visit. He has type 2 diabetes mellitus. No MedicAlert identification noted. The initial diagnosis of diabetes was made 10 years ago. His disease course has been stable. There are no hypoglycemic associated symptoms. Associated symptoms include foot paresthesias. There are no hypoglycemic complications. Diabetic complications include peripheral neuropathy. Risk factors for coronary artery disease include diabetes mellitus, dyslipidemia, male sex and hypertension. Current diabetic treatment includes oral agent (triple therapy). He is compliant with treatment most of the time. He is following a generally healthy diet. Meal planning includes avoidance of concentrated sweets. He has not had a previous visit with a dietitian. He participates in exercise intermittently. His breakfast blood glucose is taken between 7-8 am. His breakfast blood glucose range is generally 130-140 mg/dl. His bedtime blood glucose is taken between 10-11 pm. His bedtime blood glucose range is generally 70-90 mg/dl. An ACE inhibitor/angiotensin II receptor blocker is not being taken. Eye exam is current.        He takes Amaryl 2 mg BID and is having hypoglycemia       Past Medical History:   Diagnosis Date    Diabetes mellitus ([address]     Hyperlipidemia     Hypertension       Patient extended release tablet Take 1 tablet by mouth daily (with breakfast) 30 tablet 1    Empagliflozin-metFORMIN HCl ER (SYNJARDY XR)  MG TB24 Take 1 tab daily 42 tablet 0    pioglitazone (ACTOS) 45 MG tablet Take 45 mg by mouth daily      hydroCHLOROthiazide (MICROZIDE) 12.5 MG capsule Take 12.5 mg by mouth daily      amLODIPine-benazepril (LOTREL) 5-10 MG per capsule Take 1 capsule by mouth daily      PARoxetine (PAXIL) 20 MG tablet Take 20 mg by mouth nightly      Rosuvastatin Calcium 10 MG CPSP Take by mouth nightly       No current facility-administered medications for this visit. No Known Allergies  No family status information on file. Review of Systems  I have reviewed the review of system questionnaire filled by the patient .   Patient was advised to contact PCP for non endocrine signs and symptoms       OBJECTIVE:  /75   Pulse 86   Resp 16   Ht 5' 10\" (1.778 m)   Wt 185 lb (83.9 kg)   BMI 26.54 kg/m²    Wt Readings from Last 3 Encounters:   11/02/22 185 lb (83.9 kg)   04/11/22 182 lb (82.6 kg)   04/01/22 183 lb (83 kg)       Constitutional: no acute distress, well appearing and well nourished  Psychiatric: oriented to person, place and time, judgement and insight and normal, recent and remote memory and intact and mood and affect are normal  Skin: skin and subcutaneous tissue is normal without mass, normal turgor  Head and Face: examination of head and face revealed no abnormalities  Eyes: no lid or conjunctival swelling, erythema or discharge, pupils are normal  Ears/Nose: external inspection of ears and nose revealed no abnormalities, hearing is grossly normal  Oropharynx/Mouth/Face: lips, tongue and gums are normal with no lesions, the voice quality was normal  Neck: neck is supple and symmetric, with midline trachea and no masses, thyroid is normal  Lymphatics: normal cervical lymph nodes, normal supraclavicular nodes  Pulmonary: no increased work of breathing or signs of respiratory distress, lungs are clear to auscultation  Cardiovascular: normal heart rate and rhythm, normal S1 and S2, no murmurs   Abdomen: abdomen is soft, non-tender with no masses  Musculoskeletal: normal gait and station . Neurological: normal coordination and normal general cortical function    Visual inspection:  Deformity/amputation: absent  Skin lesions/pre-ulcerative calluses: absent  Edema: right- negative, left- negative    Sensory exam:  Monofilament sensation: normal  (minimum of 5 random plantar locations tested, avoiding callused areas - > 1 area with absence of sensation is + for neuropathy)    Plus at least one of the following:  Pulses: normal,   Pinprick: Intact  Proprioception: Intact  Vibration (128 Hz): Intact    Lab Results   Component Value Date/Time    LABA1C 6.1 11/02/2022 08:31 AM    LABA1C 6.7 03/30/2022 02:34 PM    LABA1C 6.4 04/14/2021 09:30 AM         ASSESSMENT/PLAN:  1. Type 2 diabetes mellitus without complication, without long-term current use of insulin ([facility])  A1c 6.1     I had a lengthy discussion with the patient about  his  uncontrolled diabetes. We discussed progression of diabetes in detail and also the incidence of complications associated with uncontrolled diabetes. Nely Flori Nichols was advised that lifestyle modification is the key to better control of his Diabetes. We discussed carbohydrate restriction in detail. Patient is having hypoglycemia specifically later at night sometimes during the night. He will stop taking Amaryl  Will restart low-dose metformin  mg daily if he is able to tolerate it we can switch him to metformin and Jardiance combination of Synjardy 25/thousand XR. I gave him samples of this combination but first we will try the low-dose metformin if he tolerates then will increase the dose to 1000 XR. He will continue with pioglitazone at this stage.   Will discuss GLP-1 agonist at follow-up if need be  Davey Holter was advised to check his fingerstick glucose at least 3-4 times daily. Hypoglycemia protocol reviewed in detail with patient Patient was advised to carry glucose tablets and also have glucagon emergency kit. Patient checks his fingerstick blood glucose 4-6 times a day. Patient was advised that sending of his fingerstick blood glucose logs is crucial in management of his  diabetes. I will adjust the  doses of diabetic medications  according to sent data. Health Maintenance       Last Eye Exam: advised to have annual dilated eye exam. his last eye exam was in 2022  Last Podiatry Exam:  Last foot exam was primary care physician  Lipids: LDL ordered for next visit  Microalbumin/Creatinine Ratio: I have ordered MA with next lab work     . Education: Reviewed ABCs of diabetes management (respective goals in parentheses): A1C (<7), blood pressure (<130/80), and cholesterol (LDL <100). Additional Education: referred to Diabetes Educator.    - POCT glycosylated hemoglobin (Hb A1C)  - Ambulatory Referral To Diabetes Education  - Comprehensive Metabolic Panel; Future  - Hemoglobin A1C; Future  - Lipid Panel; Future  - Microalbumin / Creatinine Urine Ratio; Future  - TSH; Future  - Empagliflozin-metFORMIN HCl ER (SYNJARDY XR)  MG TB24; Take 1 tab daily  Dispense: 42 tablet; Refill: 0    2. Essential hypertension  Blood pressure control is adequate continue with the current therapy    3. Mixed hyperlipidemia  Patient is already on statins     4. Meningioma [facility])  status post surgery in August 2021 had craniotomy and follows with neurosurgery  - Comprehensive Metabolic Panel; Future  - Hemoglobin A1C; Future  - Lipid Panel; Future  - Microalbumin / Creatinine Urine Ratio; Future  - TSH; Future    5.  Prostate cancer ([facility]  Stable underwent surgery in 2021      Reviewed and/or ordered clinical lab results yes   Reviewed and/or ordered radiology tests Yes  Reviewed and/or ordered other diagnostic tests yes   Made a decision to obtain old records yes   Reviewed and summarized old records yes     Cris Edwards was counseled regarding symptoms of current diagnosis, course and complications of disease if inadequately treated, side effects of medications, diagnosis, treatment options, and prognosis, risks, benefits, complications, and alternatives of treatment, labs, imaging and other studies and treatment targets and goals. He understands instructions and counseling    Total time spent counseling 50  min  , more than 50 % of this was spent on face to face counseling    Return in about 6 months (around 5/2/2023). Please note that some or all of this report was generated using voice recognition software. Please notify me in case of any questions about the content of this document, as some errors in transcription may have occurred .

## 2022-11-14 ENCOUNTER — TELEPHONE (OUTPATIENT)
Dept: ENDOCRINOLOGY | Age: 72
End: 2022-11-14

## 2022-11-14 DIAGNOSIS — E11.9 TYPE 2 DIABETES MELLITUS WITHOUT COMPLICATION, WITHOUT LONG-TERM CURRENT USE OF INSULIN (HCC): ICD-10-CM

## 2022-11-14 RX ORDER — EMPAGLIFLOZIN, METFORMIN HYDROCHLORIDE 25; 1000 MG/1; MG/1
TABLET, EXTENDED RELEASE ORAL
Qty: 30 TABLET | Refills: 5 | Status: SHIPPED | OUTPATIENT
Start: 2022-11-14

## 2022-11-14 NOTE — TELEPHONE ENCOUNTER
Pt calling to give us 2 week update after starting his Metformin. He states no side effects. Pt was given sample of Synjardy and he wants to know if he is to continue both Metformin and Synjardy?     Pharmacy info-[facility] [address]    CB# [phone]

## 2022-11-14 NOTE — TELEPHONE ENCOUNTER
Returned patients call and advised him not to take Metformin with Synjardy because Metformin is already in the [place]. Sent an rx for the Synjardy to the pharmacy. Patient verbalized understanding.

## 2022-12-22 ENCOUNTER — TELEPHONE (OUTPATIENT)
Dept: ENDOCRINOLOGY | Age: 72
End: 2022-12-22

## 2022-12-22 DIAGNOSIS — E11.9 TYPE 2 DIABETES MELLITUS WITHOUT COMPLICATION, WITHOUT LONG-TERM CURRENT USE OF INSULIN (HCC): ICD-10-CM

## 2022-12-22 NOTE — TELEPHONE ENCOUNTER
Pt calling and requesting refill for Synjardy.  Send to [facility] in [address]   11-2-22  FOV    5-2-23

## 2023-01-10 ENCOUNTER — TELEPHONE (OUTPATIENT)
Dept: ENDOCRINOLOGY | Age: 73
End: 2023-01-10

## 2023-01-10 DIAGNOSIS — E11.9 TYPE 2 DIABETES MELLITUS WITHOUT COMPLICATION, WITHOUT LONG-TERM CURRENT USE OF INSULIN (HCC): ICD-10-CM

## 2023-01-10 RX ORDER — EMPAGLIFLOZIN, METFORMIN HYDROCHLORIDE 25; 1000 MG/1; MG/1
TABLET, EXTENDED RELEASE ORAL
Qty: 30 TABLET | Refills: 5 | Status: SHIPPED | OUTPATIENT
Start: 2023-01-10

## 2023-01-17 ENCOUNTER — TELEPHONE (OUTPATIENT)
Dept: ENDOCRINOLOGY | Age: 73
End: 2023-01-17

## 2023-01-17 NOTE — TELEPHONE ENCOUNTER
Fax from [facility] w/ labs from 1/10/23    Pts next appt not until 5/2/23

## 2023-02-06 ENCOUNTER — TELEPHONE (OUTPATIENT)
Dept: CASE MANAGEMENT | Age: 73
End: 2023-02-06

## 2023-03-14 NOTE — PROGRESS NOTES
Name_______________________________________Printed:____________________  Date and time of surgery___1/28 1230_____________________Arrival Time:_1100_______________   1. The instructions given regarding when and if a patient needs to stop oral intake prior to surgery varies. Follow the specific instructions you were given                  ___Nothing to eat or to drink after Midnight the night before. ___x_Carbo loading or ERAS instructions will be given to select patients-if you have been given those instructions -please do the following                           The evening before your surgery after dinner before midnight drink 40 ounces of gatorade. If you are diabetic use sugar free. The morning of surgery drink 40 ounces of water. This needs to be finished 3 hours prior to your surgery start time. 2. Take the following pills with a small sip of water on the morning of surgery_____none______________________________________________                  Do not take blood pressure medications ending in pril or sartan the kaylan prior to surgery or the morning of surgery_   3. Aspirin, Ibuprofen, Advil, Naproxen, Vitamin E and other Anti-inflammatory products and supplements should be stopped for 5 -7days before surgery or as directed by your physician. 4. Check with your Doctor regarding stopping Plavix, Coumadin,Eliquis, Lovenox,Effient,Pradaxa,Xarelto, Fragmin or other blood thinners and follow their instructions. 5. Do not smoke, and do not drink any alcoholic beverages 24 hours prior to surgery. This includes NA Beer. Refrain from the usage of any recreational drugs. 6. You may brush your teeth and gargle the morning of surgery. DO NOT SWALLOW WATER   7. You MUST make arrangements for a responsible adult to stay on site while you are here and take you home after your surgery. You will not be allowed to leave alone or drive yourself home.   It is strongly suggested someone stay with you the first information:  [employer]/patient-eprep              20.During flu season no children under the age of 15 are permitted in the hospital for the safety of all patients. 21. If you take a long acting insulin in the evening only  take half of your usual  dose the night  before your procedure              22. If you use a c-pap please bring DOS if staying overnight,             23.For your convenience [facility] has a pharmacy on site to fill your prescriptions. 24. If you use oxygen and have a portable tank please bring it  with you the DOS             25. Bring a complete list of all your medications with name and dose include any supplements. 26. Other_pcp  To schedule  PATs 1/22_________________________________________   *Please call pre admission testing if you any further questions   Katia Rowland         [phone]   [address]    [address]  [phone]   [facility]    __ Félix Vaughnlder _____  __x Scheduled _1/22__ Where _patient to schedule at [facility]__   __ Other __________      VISITOR POLICY(subject to change)    There is a one visitor policy at [facility] for all surgeries and endoscopies. Whether the visitor can stay or will be asked to wait in the car will depend on the current policy and if social distancing can be maintained. The policy is subject to change at any time. Please make sure the visitor has a cell phone that is on,charged and able to accept calls, as this may be the way that the staff communicates with them. Pain management is NO VISITOR policyThe patients ride is expected to remain in the car with a cell phone for communication. If the ride is leaving the hospital grounds please make sure they are back in time for pickup. Have the patient inform the staff on arrival what their rides plans are while the patient is in the facility. At the MAIN there is one visitor allowed. Please note that the visitor policy is subject to change. All above information reviewed with patient in person or by phone. Patient verbalizes understanding. All questions and concerns addressed.                                                                                                  Patient/Rep___per phone/pt_________________                                                                                                                                    PRE OP INSTRUCTIONS Stable

## 2023-04-05 ENCOUNTER — TELEPHONE (OUTPATIENT)
Dept: CASE MANAGEMENT | Age: 73
End: 2023-04-05

## 2023-04-27 ENCOUNTER — TELEPHONE (OUTPATIENT)
Dept: ENDOCRINOLOGY | Age: 73
End: 2023-04-27

## 2023-04-27 DIAGNOSIS — E11.9 TYPE 2 DIABETES MELLITUS WITHOUT COMPLICATION, WITHOUT LONG-TERM CURRENT USE OF INSULIN (HCC): ICD-10-CM

## 2023-04-27 RX ORDER — EMPAGLIFLOZIN, METFORMIN HYDROCHLORIDE 25; 1000 MG/1; MG/1
TABLET, EXTENDED RELEASE ORAL
Qty: 90 TABLET | Refills: 1 | Status: SHIPPED | OUTPATIENT
Start: 2023-04-27

## 2023-04-27 NOTE — TELEPHONE ENCOUNTER
Fax from [employer] w/ 100 day refill request for Synjardy XR tab     LOV    11-2-22  FOV   5-2-23

## 2023-05-02 ENCOUNTER — OFFICE VISIT (OUTPATIENT)
Dept: ENDOCRINOLOGY | Age: 73
End: 2023-05-02
Payer: MEDICARE

## 2023-05-02 VITALS
SYSTOLIC BLOOD PRESSURE: 132 MMHG | WEIGHT: 180 LBS | DIASTOLIC BLOOD PRESSURE: 79 MMHG | HEART RATE: 83 BPM | HEIGHT: 70 IN | RESPIRATION RATE: 16 BRPM | BODY MASS INDEX: 25.77 KG/M2

## 2023-05-02 DIAGNOSIS — E11.9 TYPE 2 DIABETES MELLITUS WITHOUT COMPLICATION, WITHOUT LONG-TERM CURRENT USE OF INSULIN (HCC): Primary | ICD-10-CM

## 2023-05-02 DIAGNOSIS — C61 PROSTATE CANCER (HCC): ICD-10-CM

## 2023-05-02 DIAGNOSIS — I10 ESSENTIAL HYPERTENSION: ICD-10-CM

## 2023-05-02 PROCEDURE — 99213 OFFICE O/P EST LOW 20 MIN: CPT | Performed by: INTERNAL MEDICINE

## 2023-05-02 PROCEDURE — 3075F SYST BP GE 130 - 139MM HG: CPT | Performed by: INTERNAL MEDICINE

## 2023-05-02 PROCEDURE — 1123F ACP DISCUSS/DSCN MKR DOCD: CPT | Performed by: INTERNAL MEDICINE

## 2023-05-02 PROCEDURE — 3078F DIAST BP <80 MM HG: CPT | Performed by: INTERNAL MEDICINE

## 2023-05-02 NOTE — PROGRESS NOTES
Milad Montelongo is a 74 y.o. male seen for  Type 2  diabetes. Milad Montelongo was diagnosed with Diabetes mellitus at age 59 . He on Actos , jardiance and Amaryl . Metformin caused diarrhea   Diabetes was diagnosed at routine screening . Milad Montelongo got diabetic education in the past.  Comorbid conditions:  neuropath  Having some hypoglycemia at night. Pt has hyperlipidemia and is on statins   He has been treated for rt sided meningioma on 8/2021and underwent craniotomy and resection and Radiation he follows with neurosurgery. He has hx of prostate cancer     INTERIM:    Diabetes  He presents for his follow-up diabetic visit. He has type 2 diabetes mellitus. No MedicAlert identification noted. The initial diagnosis of diabetes was made 10 years ago. His disease course has been stable. There are no hypoglycemic associated symptoms. Associated symptoms include foot paresthesias. There are no hypoglycemic complications. Diabetic complications include peripheral neuropathy. Risk factors for coronary artery disease include diabetes mellitus, dyslipidemia, male sex and hypertension. Current diabetic treatment includes oral agent (triple therapy). He is compliant with treatment most of the time. He is following a generally healthy diet. Meal planning includes avoidance of concentrated sweets. He has not had a previous visit with a dietitian. He participates in exercise intermittently. His breakfast blood glucose is taken between 7-8 am. His breakfast blood glucose range is generally 130-140 mg/dl. His bedtime blood glucose is taken between 10-11 pm. His bedtime blood glucose range is generally 70-90 mg/dl. An ACE inhibitor/angiotensin II receptor blocker is not being taken. Eye exam is current.        He takes Amaryl 2 mg BID and is having hypoglycemia       Past Medical History:   Diagnosis Date    Diabetes mellitus ([address]     Hyperlipidemia     Hypertension       Patient Active Problem List   Diagnosis    Prostate

## 2023-06-03 ENCOUNTER — HOSPITAL ENCOUNTER (OUTPATIENT)
Dept: CT IMAGING | Age: 73
Discharge: HOME | End: 2023-06-03
Payer: MEDICARE

## 2023-06-03 DIAGNOSIS — Z87.891 PERSONAL HISTORY OF TOBACCO USE, PRESENTING HAZARDS TO HEALTH: ICD-10-CM

## 2023-06-03 DIAGNOSIS — F17.210 CIGARETTE SMOKER: ICD-10-CM

## 2023-06-03 DIAGNOSIS — Z72.0 TOBACCO USE: ICD-10-CM

## 2023-06-03 PROCEDURE — 71271 CT THORAX LUNG CANCER SCR C-: CPT

## 2023-08-15 ENCOUNTER — TELEPHONE (OUTPATIENT)
Dept: ENDOCRINOLOGY | Age: 73
End: 2023-08-15

## 2023-08-15 DIAGNOSIS — E11.9 TYPE 2 DIABETES MELLITUS WITHOUT COMPLICATION, WITHOUT LONG-TERM CURRENT USE OF INSULIN (HCC): Primary | ICD-10-CM

## 2023-08-15 NOTE — TELEPHONE ENCOUNTER
Pt calling and states he takes Synjardy and has been paying $47 per month now it has went up to $182 per month. Pt is asking if he can be switched back to Metformin. Pt is aware Dr Sue Brown will return to office on 8/17.  Pt states he has 2 pills left of Synjardy    [facility] [address] [facility]    # [phone]

## 2023-08-16 NOTE — TELEPHONE ENCOUNTER
Patient made aware Jardiance and Metformin have been sent to the pharmacy and he will let us know if the Larisa Link is too expensive.

## 2023-11-07 ENCOUNTER — OFFICE VISIT (OUTPATIENT)
Dept: ENDOCRINOLOGY | Age: 73
End: 2023-11-07
Payer: MEDICARE

## 2023-11-07 VITALS
HEART RATE: 92 BPM | BODY MASS INDEX: 24.2 KG/M2 | DIASTOLIC BLOOD PRESSURE: 67 MMHG | WEIGHT: 169 LBS | HEIGHT: 70 IN | SYSTOLIC BLOOD PRESSURE: 106 MMHG | RESPIRATION RATE: 14 BRPM | TEMPERATURE: 98 F

## 2023-11-07 DIAGNOSIS — E11.9 TYPE 2 DIABETES MELLITUS WITHOUT COMPLICATION, WITHOUT LONG-TERM CURRENT USE OF INSULIN (HCC): ICD-10-CM

## 2023-11-07 DIAGNOSIS — I10 PRIMARY HYPERTENSION: ICD-10-CM

## 2023-11-07 DIAGNOSIS — E11.69 TYPE 2 DIABETES MELLITUS WITH OTHER SPECIFIED COMPLICATION, WITHOUT LONG-TERM CURRENT USE OF INSULIN (HCC): Primary | ICD-10-CM

## 2023-11-07 DIAGNOSIS — I10 ESSENTIAL HYPERTENSION: ICD-10-CM

## 2023-11-07 DIAGNOSIS — C61 PROSTATE CANCER (HCC): ICD-10-CM

## 2023-11-07 DIAGNOSIS — F32.A DEPRESSION, UNSPECIFIED DEPRESSION TYPE: ICD-10-CM

## 2023-11-07 LAB
ALBUMIN SERPL-MCNC: 4.4 G/DL (ref 3.4–5)
ALBUMIN/GLOB SERPL: 1.6 {RATIO} (ref 1.1–2.2)
ALP SERPL-CCNC: 60 U/L (ref 40–129)
ALT SERPL-CCNC: 14 U/L (ref 10–40)
ANION GAP SERPL CALCULATED.3IONS-SCNC: 16 MMOL/L (ref 3–16)
AST SERPL-CCNC: 18 U/L (ref 15–37)
BILIRUB SERPL-MCNC: 1.2 MG/DL (ref 0–1)
BUN SERPL-MCNC: 23 MG/DL (ref 7–20)
CALCIUM SERPL-MCNC: 9.9 MG/DL (ref 8.3–10.6)
CHLORIDE SERPL-SCNC: 94 MMOL/L (ref 99–110)
CHOLEST SERPL-MCNC: 116 MG/DL (ref 0–199)
CO2 SERPL-SCNC: 31 MMOL/L (ref 21–32)
CREAT SERPL-MCNC: 1 MG/DL (ref 0.8–1.3)
CREAT UR-MCNC: 223.8 MG/DL (ref 39–259)
GFR SERPLBLD CREATININE-BSD FMLA CKD-EPI: >60 ML/MIN/{1.73_M2}
GLUCOSE SERPL-MCNC: 127 MG/DL (ref 70–99)
HDLC SERPL-MCNC: 37 MG/DL (ref 40–60)
LDLC SERPL CALC-MCNC: 54 MG/DL
MICROALBUMIN UR DL<=1MG/L-MCNC: 1.4 MG/DL
MICROALBUMIN/CREAT UR: 6.3 MG/G (ref 0–30)
POTASSIUM SERPL-SCNC: 4.8 MMOL/L (ref 3.5–5.1)
PROT SERPL-MCNC: 7.1 G/DL (ref 6.4–8.2)
SODIUM SERPL-SCNC: 141 MMOL/L (ref 136–145)
TRIGL SERPL-MCNC: 126 MG/DL (ref 0–150)
TSH SERPL DL<=0.005 MIU/L-ACNC: 1.67 UIU/ML (ref 0.27–4.2)
VLDLC SERPL CALC-MCNC: 25 MG/DL

## 2023-11-07 PROCEDURE — 1123F ACP DISCUSS/DSCN MKR DOCD: CPT | Performed by: INTERNAL MEDICINE

## 2023-11-07 PROCEDURE — 3074F SYST BP LT 130 MM HG: CPT | Performed by: INTERNAL MEDICINE

## 2023-11-07 PROCEDURE — 99214 OFFICE O/P EST MOD 30 MIN: CPT | Performed by: INTERNAL MEDICINE

## 2023-11-07 PROCEDURE — 3078F DIAST BP <80 MM HG: CPT | Performed by: INTERNAL MEDICINE

## 2023-11-07 RX ORDER — EMPAGLIFLOZIN, METFORMIN HYDROCHLORIDE 5; 1000 MG/1; MG/1
TABLET, EXTENDED RELEASE ORAL
Qty: 112 TABLET | Refills: 0 | Status: SHIPPED | COMMUNITY
Start: 2023-11-07 | End: 2023-11-07 | Stop reason: CLARIF

## 2023-11-07 NOTE — PROGRESS NOTES
and no masses, thyroid is normal  Lymphatics: normal cervical lymph nodes, normal supraclavicular nodes  Pulmonary: no increased work of breathing or signs of respiratory distress, lungs are clear to auscultation  Cardiovascular: normal heart rate and rhythm, normal S1 and S2,Musculoskeletal: normal gait and station . Neurological: normal coordination and normal general cortical function      Lab Results   Component Value Date/Time    LABA1C 6.1 11/02/2022 08:31 AM    LABA1C 6.7 03/30/2022 02:34 PM    LABA1C 6.4 04/14/2021 09:30 AM         ASSESSMENT/PLAN:    1. Type 2 diabetes mellitus without complication, without long-term current use of insulin   A1c 6.1>>7.1>>6.1 in nov 2022     He is on metformin + actos   Synjardy was expensive so he could not afford it  He admits to dietary noncompliance   If A1c stays below 7 he will follow-up with his primary care physician. Lost 11 lbs which was unintentional but he does notice decrease in appetite he was advised to discuss it with his primary care physician  Previously he was on sulfonylurea which was stopped  A1c still pending      Patient was advised that sending of his fingerstick blood glucose logs is crucial in management of his  diabetes. I will adjust the  doses of diabetic medications  according to sent data. Health Maintenance       Last Eye Exam: advised to have annual dilated eye exam. his last eye exam was in 2022  Last Podiatry Exam:  Last foot exam was primary care physician  Lipids: LDL ordered for next visit  Microalbumin/Creatinine Ratio: I have ordered MA with next lab work       2. Essential hypertension  Blood pressure is low   Lotrel 5--10 mg daily   HCTZ 12.5 mg since BP low he will reduce HCTZ and recheck BP at home and was advised to call back primary care physician as he is going to follow-up with PCP in future    3. Mixed hyperlipidemia  Patient is already on statins     4.  Meningioma [facility])  status post surgery in August 2021 had

## 2023-11-08 LAB
EST. AVERAGE GLUCOSE BLD GHB EST-MCNC: 165.7 MG/DL
HBA1C MFR BLD: 7.4 %

## 2024-01-01 DIAGNOSIS — E11.69 TYPE 2 DIABETES MELLITUS WITH OTHER SPECIFIED COMPLICATION, WITHOUT LONG-TERM CURRENT USE OF INSULIN (HCC): Primary | ICD-10-CM

## 2024-01-02 NOTE — TELEPHONE ENCOUNTER
Requested Prescriptions     Pending Prescriptions Disp Refills    metFORMIN (GLUCOPHAGE) 1000 MG tablet [Pharmacy Med Name: metFORMIN HCl 1000 MG Oral Tablet] 60 tablet 0     Sig: TAKE 1 TABLET BY MOUTH TWICE DAILY WITH MEALS     He is on metformin + actos

## 2024-01-27 DIAGNOSIS — E11.69 TYPE 2 DIABETES MELLITUS WITH OTHER SPECIFIED COMPLICATION, WITHOUT LONG-TERM CURRENT USE OF INSULIN (HCC): ICD-10-CM

## 2024-01-29 NOTE — TELEPHONE ENCOUNTER
Requested Prescriptions     Pending Prescriptions Disp Refills    metFORMIN (GLUCOPHAGE) 1000 MG tablet [Pharmacy Med Name: metFORMIN HCl 1000 MG Oral Tablet] 60 tablet 0     Sig: TAKE 1 TABLET BY MOUTH TWICE DAILY WITH MEALS       Last OV 11/07/2023    NOV none on file

## 2024-03-01 DIAGNOSIS — E11.69 TYPE 2 DIABETES MELLITUS WITH OTHER SPECIFIED COMPLICATION, WITHOUT LONG-TERM CURRENT USE OF INSULIN (HCC): ICD-10-CM

## 2024-03-01 NOTE — TELEPHONE ENCOUNTER
Last OV 11/07/2023  NOV None on file     Requested Prescriptions     Pending Prescriptions Disp Refills    metFORMIN (GLUCOPHAGE) 1000 MG tablet [Pharmacy Med Name: metFORMIN HCl 1000 MG Oral Tablet] 60 tablet 0     Sig: TAKE 1 TABLET BY MOUTH TWICE DAILY WITH MEALS       [facility] [address] [place] [address] - [address] [place] [address] -  [phone] - F [phone] [address] [facility] [address]  Phone: [phone] Fax: [phone]

## 2024-03-30 DIAGNOSIS — E11.69 TYPE 2 DIABETES MELLITUS WITH OTHER SPECIFIED COMPLICATION, WITHOUT LONG-TERM CURRENT USE OF INSULIN (HCC): ICD-10-CM

## 2024-04-05 ENCOUNTER — TELEPHONE (OUTPATIENT)
Dept: ENDOCRINOLOGY | Age: 74
End: 2024-04-05

## 2024-04-05 NOTE — TELEPHONE ENCOUNTER
Pt calling and states his sugars have been running in the 350 range for about the last 3-4 days. Asked pt if he was taking any steroid or doing steroid injections and he states \"no, just taking his Metformin as usual\"    #[phone]

## 2024-04-06 DIAGNOSIS — E11.69 TYPE 2 DIABETES MELLITUS WITH OTHER SPECIFIED COMPLICATION, WITHOUT LONG-TERM CURRENT USE OF INSULIN (HCC): ICD-10-CM

## 2024-04-08 ENCOUNTER — OFFICE VISIT (OUTPATIENT)
Dept: ENDOCRINOLOGY | Age: 74
End: 2024-04-08

## 2024-04-08 VITALS
RESPIRATION RATE: 16 BRPM | WEIGHT: 190 LBS | HEIGHT: 70 IN | SYSTOLIC BLOOD PRESSURE: 135 MMHG | BODY MASS INDEX: 27.2 KG/M2 | DIASTOLIC BLOOD PRESSURE: 76 MMHG | HEART RATE: 88 BPM

## 2024-04-08 DIAGNOSIS — E78.2 MIXED HYPERLIPIDEMIA: ICD-10-CM

## 2024-04-08 DIAGNOSIS — I10 ESSENTIAL HYPERTENSION: ICD-10-CM

## 2024-04-08 DIAGNOSIS — E11.69 TYPE 2 DIABETES MELLITUS WITH OTHER SPECIFIED COMPLICATION, WITHOUT LONG-TERM CURRENT USE OF INSULIN (HCC): Primary | ICD-10-CM

## 2024-04-08 DIAGNOSIS — I10 PRIMARY HYPERTENSION: ICD-10-CM

## 2024-04-08 LAB — HBA1C MFR BLD: 8.5 %

## 2024-04-08 NOTE — PROGRESS NOTES
Matthew Nichols is a 74 y.o. male seen for  Type 2  diabetes. Matthew Nichols was diagnosed with Diabetes mellitus at age 58 .  He on Actos , jardiance and Amaryl .  Metformin caused diarrhea   Diabetes was diagnosed at routine screening . Matthew Nichols got diabetic education in the past.  Comorbid conditions:  neuropath  Having some hypoglycemia at night.  Pt has hyperlipidemia and is on statins   He has been treated for rt sided meningioma on 8/2021and underwent craniotomy and resection and Radiation he follows with neurosurgery.  He has hx of prostate cancer     INTERIM:    Diabetes  He presents for his follow-up diabetic visit. He has type 2 diabetes mellitus. No MedicAlert identification noted. The initial diagnosis of diabetes was made 10 years ago. His disease course has been stable. There are no hypoglycemic associated symptoms. Associated symptoms include foot paresthesias. There are no hypoglycemic complications. Diabetic complications include peripheral neuropathy. Risk factors for coronary artery disease include diabetes mellitus, dyslipidemia, male sex and hypertension. Current diabetic treatment includes oral agent (triple therapy). He is compliant with treatment most of the time. He is following a generally healthy diet. Meal planning includes avoidance of concentrated sweets. He has not had a previous visit with a dietitian. He participates in exercise intermittently. His breakfast blood glucose is taken between 7-8 am. His breakfast blood glucose range is generally 130-140 mg/dl. His bedtime blood glucose is taken between 10-11 pm. His bedtime blood glucose range is generally 70-90 mg/dl. An ACE inhibitor/angiotensin II receptor blocker is not being taken. Eye exam is current.      He has lost 11 lbs since May 2023 he does not think it is abnormal as he is not eating as much  Wife diagnosed with Myasethenia Gravis and is now in rehab   Jardiance was expensive and so was Synjardy      Past Medical

## 2024-04-08 NOTE — TELEPHONE ENCOUNTER
Requested Prescriptions     Pending Prescriptions Disp Refills    metFORMIN (GLUCOPHAGE) 1000 MG tablet [Pharmacy Med Name: metFORMIN HCl 1000 MG Oral Tablet] 60 tablet 0     Sig: TAKE 1 TABLET BY MOUTH TWICE DAILY WITH MEALS       [facility][note 3] - [address] -  [phone] - F [phone]  [facility] [address]  Phone: [phone] Fax: [phone]    He is on metformin + actos

## 2024-05-04 DIAGNOSIS — E11.69 TYPE 2 DIABETES MELLITUS WITH OTHER SPECIFIED COMPLICATION, WITHOUT LONG-TERM CURRENT USE OF INSULIN (HCC): ICD-10-CM

## 2024-05-10 ENCOUNTER — TELEPHONE (OUTPATIENT)
Dept: CASE MANAGEMENT | Age: 74
End: 2024-05-10

## 2024-07-10 ENCOUNTER — TELEPHONE (OUTPATIENT)
Dept: CASE MANAGEMENT | Age: 74
End: 2024-07-10

## 2024-07-10 NOTE — TELEPHONE ENCOUNTER
Patient meets lung screening criteria. Patient due for annual CT Lung Screening. Second reminder letter mailed. Patient may call [phone] to schedule.     Lung Screen Criteria  Age 50-80  Current smoker or quit within the last 15 years  Has => 20 pack year history.    Future Appointments   Date Time Provider Department Center   8/8/2024  9:00 AM Katya Morris MD [facility]       Routed message to Dr. Arroyo for order.  No Active Lung Screen order on chart.

## 2024-08-08 RX ORDER — EMPAGLIFLOZIN AND METFORMIN HYDROCHLORIDE 12.5; 1 MG/1; MG/1
TABLET ORAL
Qty: 180 TABLET | Refills: 0 | Status: SHIPPED | OUTPATIENT
Start: 2024-08-08

## 2024-08-09 ENCOUNTER — TELEPHONE (OUTPATIENT)
Dept: CASE MANAGEMENT | Age: 74
End: 2024-08-09

## 2024-08-09 NOTE — TELEPHONE ENCOUNTER
Patient meets lung screening criteria. Patient due for annual CT Lung Screening. Final reminder letter mailed. If ordered, Patient may call [phone] to schedule.     Lung Screen Criteria  Age 50-80  Current smoker or quit within the last 15 years  Has => 20 pack year history.    Future Appointments   Date Time Provider Department Center   11/13/2024  9:40 AM Katya Morris MD [facility]       No Active Lung Screen order on chart.

## 2024-12-23 RX ORDER — EMPAGLIFLOZIN AND METFORMIN HYDROCHLORIDE 12.5; 1 MG/1; MG/1
TABLET ORAL
Qty: 180 TABLET | Refills: 0 | OUTPATIENT
Start: 2024-12-23

## 2025-02-05 RX ORDER — EMPAGLIFLOZIN AND METFORMIN HYDROCHLORIDE 12.5; 1 MG/1; MG/1
TABLET ORAL
Qty: 180 TABLET | Refills: 0 | OUTPATIENT
Start: 2025-02-05

## (undated) DEVICE — BNDG,ELSTC,MATRIX,STRL,3"X5YD,LF,HOOK&LP: Brand: MEDLINE

## (undated) DEVICE — TROCAR LAP L100MM DIA5MM BLDELSS W/ STBL SL ENDOPATH XCEL

## (undated) DEVICE — SUTURE V-LOC 90 3-0 L6IN ABSRB UD CV-23 L17MM 1/2 CIR VLOCM1904

## (undated) DEVICE — 3M™ STERI-DRAPE™ INCISE DRAPE 1050 (60CM X 45CM): Brand: STERI-DRAPE™

## (undated) DEVICE — SUTURE VLOC 90 2/0 VL 6 GS-22 VLOCM2105

## (undated) DEVICE — DRAPE HND W114XL142IN BLU POLYPR W O PCH FEN CRD AND TB HLDR

## (undated) DEVICE — INSUFFLATION NEEDLE TO ESTABLISH PNEUMOPERITONEUM.: Brand: INSUFFLATION NEEDLE

## (undated) DEVICE — TRAY PREP DRY W/ PREM GLV 2 APPL 6 SPNG 2 UNDPD 1 OVERWRAP

## (undated) DEVICE — SYRINGE MED 10ML LUERLOCK TIP W/O SFTY DISP

## (undated) DEVICE — SYNCHROSEAL: Brand: DA VINCI ENERGY

## (undated) DEVICE — TOWEL,OR,DSP,ST,BLUE,STD,4/PK,20PK/CS: Brand: MEDLINE

## (undated) DEVICE — HYPODERMIC SAFETY NEEDLE: Brand: MAGELLAN

## (undated) DEVICE — PACK PROCEDURE SURG EXTREMITY MFFOP CUST

## (undated) DEVICE — SOLUTION IV IRRIG WATER 1000ML POUR BRL 2F7114

## (undated) DEVICE — Z INACTIVE USE 2641839 CLIP INT M L POLYMER LOK LIG HEM O LOK

## (undated) DEVICE — ELECTRODE PT RET AD L9FT HI MOIST COND ADH HYDRGEL CORDED

## (undated) DEVICE — CANNULA SEAL

## (undated) DEVICE — LARGE NEEDLE DRIVER: Brand: ENDOWRIST

## (undated) DEVICE — 30977 SEE SHARP - ENHANCED INTRAOPERATIVE LAPAROSCOPE CLEANING & DEFOGGING: Brand: 30977 SEE SHARP - ENHANCED INTRAOPERATIVE LAPAROSCOPE CLEANING & DEFOGGING

## (undated) DEVICE — SUTURE ETHLN SZ 4-0 L18IN NONABSORBABLE BLK L19MM PS-2 3/8 1667H

## (undated) DEVICE — PAD,ABDOMINAL,8"X10",ST,LF: Brand: MEDLINE

## (undated) DEVICE — BLANKET WRM W29.9XL79.1IN UP BODY FORC AIR MISTRAL-AIR

## (undated) DEVICE — SUTURE NONABSORBABLE MONOFILAMENT 3-0 PS-1 18 IN BLK ETHILON 1663H

## (undated) DEVICE — Device

## (undated) DEVICE — INVIEW CLEAR LEGGINGS: Brand: CONVERTORS

## (undated) DEVICE — LIGHT HANDLE: Brand: DEVON

## (undated) DEVICE — CATHETER,FOLEY,SILI-ELAST,LTX,18FR,30ML: Brand: MEDLINE

## (undated) DEVICE — GLOVE SURG SZ 75 L12IN FNGR THK94MIL WHT POLYMER LTX BEAD

## (undated) DEVICE — BANDAGE COMPR W4INXL12FT E DISP ESMARCH EVEN

## (undated) DEVICE — SUTURE ETHLN SZ 3-0 L18IN NONABSORBABLE BLK PS-2 L19MM 3/8 1669H

## (undated) DEVICE — ZIMMER® STERILE DISPOSABLE TOURNIQUET CUFF WITH PLC, DUAL PORT, SINGLE BLADDER, 18 IN. (46 CM)

## (undated) DEVICE — SWAB CULT SGL AMIES W/O CHAR FOR THRT VAG SKIN HRT CULTSWAB

## (undated) DEVICE — TIP COVER ACCESSORY

## (undated) DEVICE — GLOVE ORANGE PI 7 1/2   MSG9075

## (undated) DEVICE — AIRSEAL 8 MM ACCESS PORT AND LOW PROFILE OBTURATOR WITH BLADELESS OPTICAL TIP, 120 MM LENGTH: Brand: AIRSEAL

## (undated) DEVICE — SYRINGE, LUER LOCK, 10ML: Brand: MEDLINE

## (undated) DEVICE — COLLECTOR SPEC RAYON LIQ STUART DBL FOR THRT VAG SKIN WND

## (undated) DEVICE — PROTECTOR EYE PT SELF ADH NS OPT GRD LF

## (undated) DEVICE — DRESSING PETRO W3XL3IN OIL EMUL N ADH GZ KNIT IMPREG CELOS

## (undated) DEVICE — GAUZE,SPONGE,4"X4",16PLY,XRAY,STRL,LF: Brand: MEDLINE

## (undated) DEVICE — INTENDED FOR TISSUE SEPARATION, AND OTHER PROCEDURES THAT REQUIRE A SHARP SURGICAL BLADE TO PUNCTURE OR CUT.: Brand: BARD-PARKER ® STAINLESS STEEL BLADES

## (undated) DEVICE — TISSUE RETRIEVAL SYSTEM: Brand: INZII RETRIEVAL SYSTEM

## (undated) DEVICE — TRI-LUMEN FILTERED TUBE SET WITH ACTIVATED CHARCOAL FILTER: Brand: AIRSEAL

## (undated) DEVICE — SUTURE VCRL SZ 3-0 L18IN ABSRB UD L26MM SH 1/2 CIR J864D

## (undated) DEVICE — CHLORAPREP 26ML ORANGE

## (undated) DEVICE — SUTURE ETHLN SZ 4-0 L18IN NONABSORBABLE BLK L13MM P-3 3/8 699G

## (undated) DEVICE — GLOVE ORANGE PI 8   MSG9080

## (undated) DEVICE — ARM DRAPE

## (undated) DEVICE — DRESSING PETRO W3XL8IN N ADH OIL EMUL GZ CURAD

## (undated) DEVICE — COLUMN DRAPE

## (undated) DEVICE — Z DISCONTINUED NO SUB IDED BAG SPEC RETRV M C240ML MOUTH 7.3MM L17CM SHFT 10MM NYL EZEE

## (undated) DEVICE — GAUZE,SPONGE,4"X4",8PLY,STRL,LF,10/TRAY: Brand: MEDLINE

## (undated) DEVICE — PENCIL ES L3M BTTN SWCH S STL HEX LOK BLDE ELECTRD HOLSTER

## (undated) DEVICE — STERILE LATEX POWDER FREE SURGICAL GLOVES WITH HYDROGEL COATING: Brand: PROTEXIS

## (undated) DEVICE — MERCY FAIRFIELD TURNOVER KIT: Brand: MEDLINE INDUSTRIES, INC.

## (undated) DEVICE — TOTAL TRAY, DB, 100% SILI FOLEY, 16FR 10: Brand: MEDLINE

## (undated) DEVICE — BANDAGE,GAUZE,CONFORMING,3"X75",STRL,LF: Brand: MEDLINE

## (undated) DEVICE — SUTURE MCRYL + SZ 4-0 L27IN ABSRB UD L19MM PS-2 3/8 CIR MCP426H

## (undated) DEVICE — 3M™ STERI-STRIP™ REINFORCED ADHESIVE SKIN CLOSURES, R1547, 1/2 IN X 4 IN (12 MM X 100 MM), 6 STRIPS/ENVELOPE: Brand: 3M™ STERI-STRIP™

## (undated) DEVICE — BLADELESS OBTURATOR: Brand: WECK VISTA

## (undated) DEVICE — ROBOTIC PK

## (undated) DEVICE — MEDICINE CUP, GRADUATED, STER: Brand: MEDLINE

## (undated) DEVICE — ADHESIVE SKIN CLSR 0.7ML TOP DERMBND ADV

## (undated) DEVICE — SUTURE ETHBND EXCEL SZ 0 L18IN NONABSORBABLE GRN L26MM CT-2 CX27D

## (undated) DEVICE — SUTURE MCRYL SZ 4-0 L27IN ABSRB UD L19MM PS-2 1/2 CIR PRIM Y426H

## (undated) DEVICE — GLOVE SURG SZ 8 L12IN FNGR THK79MIL GRN LTX FREE

## (undated) DEVICE — DRAPE,U/ SHT,SPLIT,PLAS,STERIL: Brand: MEDLINE